# Patient Record
Sex: FEMALE | Race: OTHER | HISPANIC OR LATINO | URBAN - METROPOLITAN AREA
[De-identification: names, ages, dates, MRNs, and addresses within clinical notes are randomized per-mention and may not be internally consistent; named-entity substitution may affect disease eponyms.]

---

## 2020-02-18 ENCOUNTER — INPATIENT (INPATIENT)
Facility: HOSPITAL | Age: 55
LOS: 3 days | Discharge: HOME | End: 2020-02-22
Attending: INTERNAL MEDICINE | Admitting: INTERNAL MEDICINE
Payer: COMMERCIAL

## 2020-02-18 VITALS
SYSTOLIC BLOOD PRESSURE: 112 MMHG | WEIGHT: 130.07 LBS | OXYGEN SATURATION: 97 % | HEART RATE: 90 BPM | RESPIRATION RATE: 16 BRPM | DIASTOLIC BLOOD PRESSURE: 67 MMHG | TEMPERATURE: 100 F | HEIGHT: 63 IN

## 2020-02-18 PROCEDURE — 99285 EMERGENCY DEPT VISIT HI MDM: CPT

## 2020-02-19 DIAGNOSIS — N39.0 URINARY TRACT INFECTION, SITE NOT SPECIFIED: ICD-10-CM

## 2020-02-19 DIAGNOSIS — Z29.9 ENCOUNTER FOR PROPHYLACTIC MEASURES, UNSPECIFIED: ICD-10-CM

## 2020-02-19 DIAGNOSIS — N20.1 CALCULUS OF URETER: ICD-10-CM

## 2020-02-19 DIAGNOSIS — N12 TUBULO-INTERSTITIAL NEPHRITIS, NOT SPECIFIED AS ACUTE OR CHRONIC: ICD-10-CM

## 2020-02-19 LAB
ALBUMIN SERPL ELPH-MCNC: 3.2 G/DL — LOW (ref 3.3–5)
ALP SERPL-CCNC: 132 U/L — HIGH (ref 40–120)
ALT FLD-CCNC: 44 U/L — SIGNIFICANT CHANGE UP (ref 12–78)
ANION GAP SERPL CALC-SCNC: 6 MMOL/L — SIGNIFICANT CHANGE UP (ref 5–17)
ANION GAP SERPL CALC-SCNC: 9 MMOL/L — SIGNIFICANT CHANGE UP (ref 5–17)
APPEARANCE UR: CLEAR — SIGNIFICANT CHANGE UP
APTT BLD: 27.9 SEC — LOW (ref 28.5–37)
AST SERPL-CCNC: 29 U/L — SIGNIFICANT CHANGE UP (ref 15–37)
BACTERIA # UR AUTO: ABNORMAL
BASOPHILS # BLD AUTO: 0.02 K/UL — SIGNIFICANT CHANGE UP (ref 0–0.2)
BASOPHILS # BLD AUTO: 0.02 K/UL — SIGNIFICANT CHANGE UP (ref 0–0.2)
BASOPHILS NFR BLD AUTO: 0.2 % — SIGNIFICANT CHANGE UP (ref 0–2)
BASOPHILS NFR BLD AUTO: 0.3 % — SIGNIFICANT CHANGE UP (ref 0–2)
BILIRUB SERPL-MCNC: 0.5 MG/DL — SIGNIFICANT CHANGE UP (ref 0.2–1.2)
BILIRUB UR-MCNC: NEGATIVE — SIGNIFICANT CHANGE UP
BLD GP AB SCN SERPL QL: SIGNIFICANT CHANGE UP
BUN SERPL-MCNC: 7 MG/DL — SIGNIFICANT CHANGE UP (ref 7–23)
BUN SERPL-MCNC: 8 MG/DL — SIGNIFICANT CHANGE UP (ref 7–23)
CALCIUM SERPL-MCNC: 8.5 MG/DL — SIGNIFICANT CHANGE UP (ref 8.5–10.1)
CALCIUM SERPL-MCNC: 8.9 MG/DL — SIGNIFICANT CHANGE UP (ref 8.5–10.1)
CHLORIDE SERPL-SCNC: 100 MMOL/L — SIGNIFICANT CHANGE UP (ref 96–108)
CHLORIDE SERPL-SCNC: 105 MMOL/L — SIGNIFICANT CHANGE UP (ref 96–108)
CO2 SERPL-SCNC: 24 MMOL/L — SIGNIFICANT CHANGE UP (ref 22–31)
CO2 SERPL-SCNC: 27 MMOL/L — SIGNIFICANT CHANGE UP (ref 22–31)
COLOR SPEC: YELLOW — SIGNIFICANT CHANGE UP
CREAT SERPL-MCNC: 0.77 MG/DL — SIGNIFICANT CHANGE UP (ref 0.5–1.3)
CREAT SERPL-MCNC: 0.94 MG/DL — SIGNIFICANT CHANGE UP (ref 0.5–1.3)
DIFF PNL FLD: ABNORMAL
EOSINOPHIL # BLD AUTO: 0 K/UL — SIGNIFICANT CHANGE UP (ref 0–0.5)
EOSINOPHIL # BLD AUTO: 0 K/UL — SIGNIFICANT CHANGE UP (ref 0–0.5)
EOSINOPHIL NFR BLD AUTO: 0 % — SIGNIFICANT CHANGE UP (ref 0–6)
EOSINOPHIL NFR BLD AUTO: 0 % — SIGNIFICANT CHANGE UP (ref 0–6)
EPI CELLS # UR: ABNORMAL
FLU A RESULT: SIGNIFICANT CHANGE UP
FLU A RESULT: SIGNIFICANT CHANGE UP
FLUAV AG NPH QL: SIGNIFICANT CHANGE UP
FLUBV AG NPH QL: SIGNIFICANT CHANGE UP
GLUCOSE SERPL-MCNC: 101 MG/DL — HIGH (ref 70–99)
GLUCOSE SERPL-MCNC: 105 MG/DL — HIGH (ref 70–99)
GLUCOSE UR QL: NEGATIVE MG/DL — SIGNIFICANT CHANGE UP
HCT VFR BLD CALC: 30.2 % — LOW (ref 34.5–45)
HCT VFR BLD CALC: 33.9 % — LOW (ref 34.5–45)
HGB BLD-MCNC: 10 G/DL — LOW (ref 11.5–15.5)
HGB BLD-MCNC: 11.4 G/DL — LOW (ref 11.5–15.5)
IMM GRANULOCYTES NFR BLD AUTO: 0.2 % — SIGNIFICANT CHANGE UP (ref 0–1.5)
IMM GRANULOCYTES NFR BLD AUTO: 0.4 % — SIGNIFICANT CHANGE UP (ref 0–1.5)
INR BLD: 1.55 RATIO — HIGH (ref 0.88–1.16)
KETONES UR-MCNC: ABNORMAL
LACTATE SERPL-SCNC: 1.3 MMOL/L — SIGNIFICANT CHANGE UP (ref 0.7–2)
LEUKOCYTE ESTERASE UR-ACNC: ABNORMAL
LYMPHOCYTES # BLD AUTO: 1.2 K/UL — SIGNIFICANT CHANGE UP (ref 1–3.3)
LYMPHOCYTES # BLD AUTO: 1.23 K/UL — SIGNIFICANT CHANGE UP (ref 1–3.3)
LYMPHOCYTES # BLD AUTO: 11.6 % — LOW (ref 13–44)
LYMPHOCYTES # BLD AUTO: 16.2 % — SIGNIFICANT CHANGE UP (ref 13–44)
MAGNESIUM SERPL-MCNC: 2.3 MG/DL — SIGNIFICANT CHANGE UP (ref 1.6–2.6)
MCHC RBC-ENTMCNC: 28.9 PG — SIGNIFICANT CHANGE UP (ref 27–34)
MCHC RBC-ENTMCNC: 29.2 PG — SIGNIFICANT CHANGE UP (ref 27–34)
MCHC RBC-ENTMCNC: 33.1 GM/DL — SIGNIFICANT CHANGE UP (ref 32–36)
MCHC RBC-ENTMCNC: 33.6 GM/DL — SIGNIFICANT CHANGE UP (ref 32–36)
MCV RBC AUTO: 86.9 FL — SIGNIFICANT CHANGE UP (ref 80–100)
MCV RBC AUTO: 87.3 FL — SIGNIFICANT CHANGE UP (ref 80–100)
MONOCYTES # BLD AUTO: 0.74 K/UL — SIGNIFICANT CHANGE UP (ref 0–0.9)
MONOCYTES # BLD AUTO: 0.76 K/UL — SIGNIFICANT CHANGE UP (ref 0–0.9)
MONOCYTES NFR BLD AUTO: 10 % — SIGNIFICANT CHANGE UP (ref 2–14)
MONOCYTES NFR BLD AUTO: 7.2 % — SIGNIFICANT CHANGE UP (ref 2–14)
NEUTROPHILS # BLD AUTO: 5.41 K/UL — SIGNIFICANT CHANGE UP (ref 1.8–7.4)
NEUTROPHILS # BLD AUTO: 8.55 K/UL — HIGH (ref 1.8–7.4)
NEUTROPHILS NFR BLD AUTO: 73.1 % — SIGNIFICANT CHANGE UP (ref 43–77)
NEUTROPHILS NFR BLD AUTO: 80.8 % — HIGH (ref 43–77)
NITRITE UR-MCNC: NEGATIVE — SIGNIFICANT CHANGE UP
NRBC # BLD: 0 /100 WBCS — SIGNIFICANT CHANGE UP (ref 0–0)
NRBC # BLD: 0 /100 WBCS — SIGNIFICANT CHANGE UP (ref 0–0)
PH UR: 8 — SIGNIFICANT CHANGE UP (ref 5–8)
PHOSPHATE SERPL-MCNC: 2.5 MG/DL — SIGNIFICANT CHANGE UP (ref 2.5–4.5)
PLATELET # BLD AUTO: 263 K/UL — SIGNIFICANT CHANGE UP (ref 150–400)
PLATELET # BLD AUTO: 290 K/UL — SIGNIFICANT CHANGE UP (ref 150–400)
POTASSIUM SERPL-MCNC: 3.3 MMOL/L — LOW (ref 3.5–5.3)
POTASSIUM SERPL-MCNC: 3.6 MMOL/L — SIGNIFICANT CHANGE UP (ref 3.5–5.3)
POTASSIUM SERPL-SCNC: 3.3 MMOL/L — LOW (ref 3.5–5.3)
POTASSIUM SERPL-SCNC: 3.6 MMOL/L — SIGNIFICANT CHANGE UP (ref 3.5–5.3)
PROT SERPL-MCNC: 8.8 GM/DL — HIGH (ref 6–8.3)
PROT UR-MCNC: 30 MG/DL
PROTHROM AB SERPL-ACNC: 17.6 SEC — HIGH (ref 10–12.9)
RBC # BLD: 3.46 M/UL — LOW (ref 3.8–5.2)
RBC # BLD: 3.9 M/UL — SIGNIFICANT CHANGE UP (ref 3.8–5.2)
RBC # FLD: 13.2 % — SIGNIFICANT CHANGE UP (ref 10.3–14.5)
RBC # FLD: 13.2 % — SIGNIFICANT CHANGE UP (ref 10.3–14.5)
RBC CASTS # UR COMP ASSIST: ABNORMAL /HPF (ref 0–4)
RSV RESULT: SIGNIFICANT CHANGE UP
RSV RNA RESP QL NAA+PROBE: SIGNIFICANT CHANGE UP
SODIUM SERPL-SCNC: 135 MMOL/L — SIGNIFICANT CHANGE UP (ref 135–145)
SODIUM SERPL-SCNC: 136 MMOL/L — SIGNIFICANT CHANGE UP (ref 135–145)
SP GR SPEC: 1.01 — SIGNIFICANT CHANGE UP (ref 1.01–1.02)
UROBILINOGEN FLD QL: NEGATIVE MG/DL — SIGNIFICANT CHANGE UP
WBC # BLD: 10.58 K/UL — HIGH (ref 3.8–10.5)
WBC # BLD: 7.4 K/UL — SIGNIFICANT CHANGE UP (ref 3.8–10.5)
WBC # FLD AUTO: 10.58 K/UL — HIGH (ref 3.8–10.5)
WBC # FLD AUTO: 7.4 K/UL — SIGNIFICANT CHANGE UP (ref 3.8–10.5)
WBC UR QL: ABNORMAL

## 2020-02-19 PROCEDURE — 99222 1ST HOSP IP/OBS MODERATE 55: CPT

## 2020-02-19 PROCEDURE — 12345: CPT | Mod: NC

## 2020-02-19 PROCEDURE — 99223 1ST HOSP IP/OBS HIGH 75: CPT

## 2020-02-19 PROCEDURE — 74176 CT ABD & PELVIS W/O CONTRAST: CPT | Mod: 26

## 2020-02-19 PROCEDURE — 71045 X-RAY EXAM CHEST 1 VIEW: CPT | Mod: 26

## 2020-02-19 RX ORDER — POTASSIUM CHLORIDE 20 MEQ
40 PACKET (EA) ORAL EVERY 4 HOURS
Refills: 0 | Status: COMPLETED | OUTPATIENT
Start: 2020-02-19 | End: 2020-02-19

## 2020-02-19 RX ORDER — SODIUM CHLORIDE 9 MG/ML
1000 INJECTION, SOLUTION INTRAVENOUS
Refills: 0 | Status: DISCONTINUED | OUTPATIENT
Start: 2020-02-19 | End: 2020-02-21

## 2020-02-19 RX ORDER — SODIUM CHLORIDE 9 MG/ML
1000 INJECTION INTRAMUSCULAR; INTRAVENOUS; SUBCUTANEOUS
Refills: 0 | Status: DISCONTINUED | OUTPATIENT
Start: 2020-02-19 | End: 2020-02-19

## 2020-02-19 RX ORDER — ACETAMINOPHEN 500 MG
650 TABLET ORAL EVERY 6 HOURS
Refills: 0 | Status: DISCONTINUED | OUTPATIENT
Start: 2020-02-19 | End: 2020-02-21

## 2020-02-19 RX ORDER — MEROPENEM 1 G/30ML
1000 INJECTION INTRAVENOUS EVERY 8 HOURS
Refills: 0 | Status: DISCONTINUED | OUTPATIENT
Start: 2020-02-19 | End: 2020-02-21

## 2020-02-19 RX ORDER — SODIUM CHLORIDE 9 MG/ML
1000 INJECTION INTRAMUSCULAR; INTRAVENOUS; SUBCUTANEOUS ONCE
Refills: 0 | Status: COMPLETED | OUTPATIENT
Start: 2020-02-19 | End: 2020-02-19

## 2020-02-19 RX ORDER — TAMSULOSIN HYDROCHLORIDE 0.4 MG/1
0.4 CAPSULE ORAL AT BEDTIME
Refills: 0 | Status: DISCONTINUED | OUTPATIENT
Start: 2020-02-19 | End: 2020-02-21

## 2020-02-19 RX ORDER — ONDANSETRON 8 MG/1
4 TABLET, FILM COATED ORAL EVERY 6 HOURS
Refills: 0 | Status: DISCONTINUED | OUTPATIENT
Start: 2020-02-19 | End: 2020-02-21

## 2020-02-19 RX ORDER — CEFEPIME 1 G/1
1000 INJECTION, POWDER, FOR SOLUTION INTRAMUSCULAR; INTRAVENOUS ONCE
Refills: 0 | Status: COMPLETED | OUTPATIENT
Start: 2020-02-19 | End: 2020-02-19

## 2020-02-19 RX ADMIN — Medication 40 MILLIEQUIVALENT(S): at 17:26

## 2020-02-19 RX ADMIN — Medication 650 MILLIGRAM(S): at 07:11

## 2020-02-19 RX ADMIN — SODIUM CHLORIDE 125 MILLILITER(S): 9 INJECTION INTRAMUSCULAR; INTRAVENOUS; SUBCUTANEOUS at 04:57

## 2020-02-19 RX ADMIN — Medication 650 MILLIGRAM(S): at 18:05

## 2020-02-19 RX ADMIN — CEFEPIME 100 MILLIGRAM(S): 1 INJECTION, POWDER, FOR SOLUTION INTRAMUSCULAR; INTRAVENOUS at 03:51

## 2020-02-19 RX ADMIN — SODIUM CHLORIDE 125 MILLILITER(S): 9 INJECTION INTRAMUSCULAR; INTRAVENOUS; SUBCUTANEOUS at 13:14

## 2020-02-19 RX ADMIN — Medication 40 MILLIEQUIVALENT(S): at 13:14

## 2020-02-19 RX ADMIN — Medication 650 MILLIGRAM(S): at 06:31

## 2020-02-19 RX ADMIN — MEROPENEM 100 MILLIGRAM(S): 1 INJECTION INTRAVENOUS at 13:14

## 2020-02-19 RX ADMIN — SODIUM CHLORIDE 1000 MILLILITER(S): 9 INJECTION INTRAMUSCULAR; INTRAVENOUS; SUBCUTANEOUS at 03:25

## 2020-02-19 RX ADMIN — Medication 40 MILLIEQUIVALENT(S): at 10:48

## 2020-02-19 RX ADMIN — MEROPENEM 100 MILLIGRAM(S): 1 INJECTION INTRAVENOUS at 05:21

## 2020-02-19 RX ADMIN — SODIUM CHLORIDE 1000 MILLILITER(S): 9 INJECTION INTRAMUSCULAR; INTRAVENOUS; SUBCUTANEOUS at 02:25

## 2020-02-19 RX ADMIN — SODIUM CHLORIDE 100 MILLILITER(S): 9 INJECTION, SOLUTION INTRAVENOUS at 19:27

## 2020-02-19 RX ADMIN — MEROPENEM 100 MILLIGRAM(S): 1 INJECTION INTRAVENOUS at 21:21

## 2020-02-19 RX ADMIN — TAMSULOSIN HYDROCHLORIDE 0.4 MILLIGRAM(S): 0.4 CAPSULE ORAL at 21:21

## 2020-02-19 NOTE — ED PROVIDER NOTE - OBJECTIVE STATEMENT
.in'' Pertinent PMH/PSH/FHx/SHx and Review of Systems contained within:    53yo F w PMH of kidney stones, previous UTI presents to ED for eval of fever x1wk.  Pt states she has been taking bactrim for UTI, but noted fever persists.  Denies CP, SOB, rash, abd pain, vomiting, diarrhea Pertinent PMH/PSH/FHx/SHx and Review of Systems contained within:    55yo F w PMH of kidney stones, previous UTI presents to ED for eval of fever x1wk.  Pt states she has been taking bactrim for UTI, but noted fever persists.  Denies CP, SOB, rash, abd pain, vomiting, diarrhea.    +fever, No photophobia/eye pain/changes in vision, No ear pain/sore throat/dysphagia, No chest pain/palpitations, no SOB/cough/wheeze/stridor, No abdominal pain, No neck/back pain, no rash, no changes in neurological status/function.

## 2020-02-19 NOTE — H&P ADULT - HISTORY OF PRESENT ILLNESS
Patient is 54 yr old female with PMH of Kidney stones ( Lithotripsy 2017 left side) , UTI ( multi drug resistance) presented to Er complaining of undocumented fever x 1 week . Patient was taking Bactrim for UTI however fever , chills and nausea persisted. In addition, patient took a laxative for constipation. Patient denies  cold, cough, sob, palp, cp, blurry vision, , headache,     While in the Er patient received 1 liter Bolus NS , IV abx cefepime , wbc    10.58  , CT Renal Left hydroureteronephrosis caused by a 3 mm mid to distal left ureteral stone.  Superimposed genitourinary infection and/or pyelonephritis should be excluded based on clinical symptoms and laboratory values.  No other stones are visualized in the kidneys, ureters or bladder. EKG NSR @ 87 t wave abnormalities         ER Doc Sarah spoke with Dr Pham who accepted the patient to hospitalist service.

## 2020-02-19 NOTE — H&P ADULT - NSICDXFAMILYHX_GEN_ALL_CORE_FT
FAMILY HISTORY:  Family history of hyperlipidemia, mother  Family history of oral cancer  Family hx of hypertension, mother  FH: myocardial infarction, father  at age 83

## 2020-02-19 NOTE — CONSULT NOTE ADULT - SUBJECTIVE AND OBJECTIVE BOX
History of Present Illness: 	  Patient is 54 yr old female with PMH of Kidney stones ( Lithotripsy 2017 left side) , UTI ( multi drug resistance) presented to Er complaining of undocumented fever x 1 week . Patient was taking Bactrim for UTI however fever , chills and nausea persisted. In addition, patient took a laxative for constipation. Patient denies  cold, cough, sob, palp, cp, blurry vision, , headache,     Past Medical, Past Surgical, and Family History:  PAST MEDICAL HISTORY:  Kidney stone.     FAMILY HISTORY:  Family history of hyperlipidemia, mother  Family history of oral cancer  Family hx of hypertension, mother  FH: myocardial infarction, father  at age 83.    Social History:  Social History (marital status, living situation, occupation, tobacco use, alcohol and drug use, and sexual history): lives with daughter  works as    no flu or pneum shot   denies ETOH, IVDA, pot , socially drinks at parties	  Review of Systems:  Review of Systems: REVIEW OF SYSTEMS:  Constitutional: Denies fever, weight loss, fatigue  Eye: Denies eye pain, visual changes, discharge, blurred vision  ENT: Denies hearing changes, tinnitus, vertigo, sinus congestion, sore throat  Neck: Denies pain or stiffness  Respiratory: Denies cough, wheezing, chills, hemoptysis, shortness of breath, difficulty breathing  Cardiovascular: Denies chest pain, palpitations, dizziness, leg swelling  Gastrointestinal: Denies abdominal pain,+ nausea, vomiting, hematemesis, diarrhea, constipation, melena, hematochezia + left cva tenderness  Genitourinary: Denies dysuria, frequency, hematuria, retention, incontinence  Neurological: Denies headaches, memory loss, loss of strength, numbness, tremors  Skin: Denies itching, burning, rashes, lesions   Endocrine: Denies heat or cold intolerance, hair loss  Musculoskeletal: Denies joint pain or swelling, back, extremity pain  Psychiatric: Denies depression, anxiety, mood swings, difficulty sleeping, suicidal ideation  Hematology: Denies easy bruising, bleeding gums Immunologic: Denies hives or eczema	      Allergies and Intolerances:        Allergies:  	No Known Allergies:

## 2020-02-19 NOTE — H&P ADULT - PROBLEM SELECTOR PLAN 1
admit to medicine  follow up urine culture and blood cx  repeat cbc , cmp, in AM  IV fluids  iv meropenem  Consult ID Dr Hardy admit to medicine, follow up urine culture and blood cx  repeat cbc , cmp, in AM   IV fluids  iv meropenem as patient reports hx of drug resistance.    Consult ID Dr Oliva

## 2020-02-19 NOTE — H&P ADULT - NSHPPHYSICALEXAM_GEN_ALL_CORE
Vital Signs Last 24 Hrs  T(C): 37.6 (18 Feb 2020 23:19), Max: 37.6 (18 Feb 2020 23:19)  T(F): 99.7 (18 Feb 2020 23:19), Max: 99.7 (18 Feb 2020 23:19)  HR: 90 (18 Feb 2020 23:19) (90 - 90)  BP: 112/67 (18 Feb 2020 23:19) (112/67 - 112/67)  BP(mean): --  RR: 16 (18 Feb 2020 23:19) (16 - 16)  SpO2: 97% (18 Feb 2020 23:19) (97% - 97%)    PHYSICAL EXAM:  GENERAL: NAD, well-groomed, well-developed  HEAD:  Atraumatic, Normocephalic  EYES: EOMI, PERRLA, conjunctiva and sclera clear  ENMT: No tonsillar erythema, exudates, or enlargement; Moist mucous membranes, Good dentition, No lesions  NECK: Supple, No JVD, Normal thyroid  NERVOUS SYSTEM:  Alert & Oriented X3, Good concentration; Motor Strength 5/5 B/L upper and lower extremities; DTRs 2+ intact and symmetric  CHEST/LUNG: Clear to auscultation bilaterally; No rales, rhonchi, wheezing, or rubs  HEART: Regular rate and rhythm; No murmurs appreciated  ABDOMEN: Soft,mild supratenderness Nondistended; Bowel sounds present + left CVA tenderness ,  MSK: ROM intact in all extremities, 5/5 strength in upper and lower extremities, B/L.  EXTREMITIES:  2+ Peripheral Pulses, No clubbing, cyanosis, or edema

## 2020-02-19 NOTE — ED ADULT NURSE NOTE - OBJECTIVE STATEMENT
pt received to bed 22 c/o fever for 1 week. pt states she sees a urologist for left kidney stone. pt states she and was given antibiotics for persistent E. coli, but has not experienced relief. c/o increased saliva in mouth, bad taste in mouth, decreased appetite. denies: sore throat, burning on urination, dysuria. pt states she took a laxative today. pt states she vomited last night. pt took Tylenol at 9PM. family at bedside

## 2020-02-19 NOTE — PROGRESS NOTE ADULT - SUBJECTIVE AND OBJECTIVE BOX
54 yr old female with history of Kidney stones ( Lithotripsy 2017 left side) , UTI ( multi drug resistance) p/w a UTI after failing Bactrim treatment. CT showed left hydroureteronephrosis caused by a 3 mm mid to distal left ureteral stone w/ a suspected genitourinary infection and/or pyelonephritis. Pt is on Merrem. She is lying in bed in NAD and has no flank tenderness. Cultures are pending. Urology is following and will do a left ureteroscopy, laser lithotripsy & stent placement tomorrow.

## 2020-02-19 NOTE — H&P ADULT - NSHPSOCIALHISTORY_GEN_ALL_CORE
lives with daughter  works as     no flu or pneum shot    denies ETOH, IVDA, pot , socially drinks at parties

## 2020-02-19 NOTE — CONSULT NOTE ADULT - ASSESSMENT
Left Pyelonephritis: urine culture Continue Merrem.    3 mm left mid ureteral stone with hydronephrosis. Chronic obstruction. ? stricture ureter or residual stone in mid ureter from previous procedure. : Once urine culture and antibiotics on board will require drainage of kidney with removal of stone and treatment of ?ureteral stricture.    Pain management.  Thanks  will follow

## 2020-02-19 NOTE — H&P ADULT - ASSESSMENT
54 yr old female with PMH of Kidney stones ( Lithotripsy 2017 left side) , UTI ( multi drug resistance) presented to Er complaining of undocumented fever x 1 week. Work Up , signs and symptoms consistent with pyelopnephritis  and ureteral stone . Patient will require at least 2 midnights for iv fluids , iv abx as detailed below

## 2020-02-19 NOTE — CONSULT NOTE ADULT - PROBLEM SELECTOR RECOMMENDATION 9
IVF  IVAB - ID consultation. Dr. Hardy called by medicine.  analgesia PRN  scheduled for OR tomorrow for left ureteroscopy, laser lithotripsy; stent placement

## 2020-02-19 NOTE — ED ADULT NURSE NOTE - ED STAT RN HANDOFF DETAILS
Report endorsed to hold RN Nury. Safety checks completed this shift. Safety rounds completed hourly. Both IV sites checked Q2+remains WDL. Medications administered as ordered with no signs/symptoms of adverse reactions. Fall & skin precautions in place. Any issues endorsed to hold RN for follow up. Awaiting bed assignment. witnessed pt ambulate with steady gait with standby assistance. family at bedside. Cefepime infusing on pump. awaiting vitals, additional antibiotic

## 2020-02-19 NOTE — CONSULT NOTE ADULT - ASSESSMENT
Vital Signs Last 24 Hrs  T(C): 38.1 (2020 06:32), Max: 38.1 (2020 06:32)  T(F): 100.5 (2020 06:32), Max: 100.5 (2020 06:32)  HR: 83 (2020 06:32) (83 - 90)  BP: 98/54 (2020 06:32) (97/57 - 112/67)  BP(mean): --  RR: 17 (2020 06:32) (16 - 17)  SpO2: 99% (2020 06:32) (97% - 99%)    Head normocephalic  ENT WNL  Cardiac nl regular rhythm, no murmur  Lungs clear to auscultation  Abd. soft, nontender, BS+   no CVAT at this time  GI WNL  Extremities WNL                          10.0   7.40  )-----------( 263      ( 2020 08:19 )             30.2           135  |  105  |  7   ----------------------------<  101<H>  3.3<L>   |  24  |  0.77    Ca    8.5      2020 08:19  Phos  2.5       Mg     2.3         TPro  8.8<H>  /  Alb  3.2<L>  /  TBili  0.5  /  DBili  x   /  AST  29  /  ALT  44  /  AlkPhos  132<H>        PT/INR - ( 2020 02:00 )   PT: 17.6 sec;   INR: 1.55 ratio         PTT - ( 2020 02:00 )  PTT:27.9 sec    Urinalysis Basic - ( 2020 02:00 )    Color: Yellow / Appearance: Clear / S.010 / pH: x  Gluc: x / Ketone: Small  / Bili: Negative / Urobili: Negative mg/dL   Blood: x / Protein: 30 mg/dL / Nitrite: Negative   Leuk Esterase: Moderate / RBC: 6-10 /HPF / WBC 26-50   Sq Epi: x / Non Sq Epi: Many / Bacteria: TNTC    < from: CT Renal Stone Hunt (20 @ 02:28) >    EXAM:  CT RENAL STONE HUNT                            PROCEDURE DATE:  2020          INTERPRETATION:  CLINICAL INFORMATION: Left flank pain.  History of renal stones.    COMPARISON: None.    PROCEDURE:   CT of the Abdomen and Pelvis was performed without intravenous contrast.   Intravenous contrast: None.  Oral contrast: None.  Sagittal and coronal reformats were performed.    FINDINGS:    LOWER CHEST: Within normal limits.    LIVER: Within normal limits.  BILE DUCTS: Normal caliber.  GALLBLADDER: Within normal limits.  SPLEEN: Within normal limits.  PANCREAS: Within normal limits.  ADRENALS: Within normal limits.  KIDNEYS/URETERS: Left hydroureteronephrosis caused by a 3 mm stone located in the mid to distal left ureter, at the levelof the pelvic brim (2:71 and 602:42).    BLADDER: Underdistended, limiting assessment.  REPRODUCTIVE ORGANS: Uterus and ovaries appear within normal limits.    BOWEL: No bowel obstruction. Normal appendix.  PERITONEUM: No ascites.  VESSELS: Within normal limits.  RETROPERITONEUM/LYMPH NODES: No lymphadenopathy.    ABDOMINAL WALL: Tiny fat-containing umbilical hernia.  BONES: Within normal limits.    IMPRESSION:     Left hydroureteronephrosis caused by a 3 mm mid to distal left ureteral stone.  Superimposed genitourinary infection and/or pyelonephritis should be excluded based on clinical symptoms and laboratory values.  No other stones are visualized in the kidneys, ureters or bladder.    BETH BRUCE M.D.,ATTENDING RADIOLOGIST  This document has been electronically signed. 2020  2:58AM         Impression:  Pt was seen with Dr. Camarena.  Left ureteral stone causing hydronephrosis  probable UTI - C&S pending    Plan:   [facility]  IVF  Medical consult  pt is scheduled to go to OR tomorrow for left ureteroscopy, laser lithotripsy

## 2020-02-19 NOTE — CONSULT NOTE ADULT - SUBJECTIVE AND OBJECTIVE BOX
HPI:  Patient is 54 yr old female with PMH of Kidney stones ( Lithotripsy 2017 left side) and stent , UTI ( multi drug resistance) presented to Er complaining of undocumented fever x 1 week . Patient was taking Bactrim for UTI however fever , chills and nausea persisted. In addition, patient took a laxative for constipation. Patient denies  cold, cough, sob, palp, cp, blurry vision, , headache,     While in the Er patient received 1 liter Bolus NS , IV abx cefepime , wbc    10.58  , CT Renal Left hydroureteronephrosis caused by a 3 mm mid to distal left ureteral stone.  Superimposed genitourinary infection and/or pyelonephritis should be excluded based on clinical symptoms and laboratory values.  No other stones are visualized in the kidneys, ureters or bladder. EKG NSR @ 87 t wave abnormalities         ER Doc Sarah spoke with Dr Pham who accepted the patient to hospitalist service. (2020 03:56)      PAST MEDICAL & SURGICAL HISTORY:  Kidney stone  ESWL and Stent 2017 for left ureteral or renal stone      Allergies    No Known Allergies    FAMILY HISTORY:     Family history of oral cancer  Family history of hyperlipidemia: mother  FH: myocardial infarction: father  at age 83  Family hx of hypertension: mother      Home Medications:  none    MEDICATIONS  (STANDING):  meropenem  IVPB 1000 milliGRAM(s) IV Intermittent every 8 hours  potassium chloride    Tablet ER 40 milliEquivalent(s) Oral every 4 hours  sodium chloride 0.9%. 1000 milliLiter(s) (125 mL/Hr) IV Continuous <Continuous>  tamsulosin 0.4 milliGRAM(s) Oral at bedtime    MEDICATIONS  (PRN):  acetaminophen   Tablet .. 650 milliGRAM(s) Oral every 6 hours PRN Temp greater or equal to 38C (100.4F), Mild Pain (1 - 3)  ondansetron Injectable 4 milliGRAM(s) IV Push every 6 hours PRN Nausea and/or Vomiting      ROS:    General:  No wt loss, chills, night sweats, has fever  ENT:  No sore throat, pain, runny nose,   CV:  No pain, palpitations  Resp:  No dyspnea, cough, tachypnea, wheezing  GI:  No pain, nausea, vomiting, diarrhea, constipation  :  No bleeding, incontinence, nocturia, frequency. Has left flank pain  Neuro:  No weakness, tingling,   Endocrine:  No polyuria, polydipsia cold/heat intolerance  Skin:  No rash,  edema      Physical Exam:    Vital Signs:  Vital Signs Last 24 Hrs  T(C): 38.1 (2020 06:32), Max: 38.1 (2020 06:32)  T(F): 100.5 (2020 06:32), Max: 100.5 (2020 06:32)  HR: 83 (2020 06:32) (83 - 90)  BP: 98/54 (2020 06:32) (97/57 - 112/67)  BP(mean): --  RR: 17 (2020 06:32) (16 - 17)  SpO2: 99% (2020 06:32) (97% - 99%)  Daily Height in cm: 160.02 (2020 06:32)    Daily   I&O's Summary      General:  Appears stated age,  well-nourished, no distress  HEENT:  NC/AT, patent nares w/ pink mucosa, OP moist and pink,   conjunctivae clear  Chest:  Full & symmetric excursion, no increased effort.   Abdomen:  Soft, non-tender, non-distended, normoactive bowel sounds. Left CVA tenderness present   Pelvic Exam: deferred  Rectal Examination: Deferred.  Extremities:  no edema, pedal pusation are present, no calf tenderness.  Skin:  No rash/erythema  Neuro/Psych:  Alert and conscious. Grossly intact and symmetrical.      LABS:                        10.0   7.40  )-----------( 263      ( 2020 08:19 )             30.2     -    135  |  105  |  7   ----------------------------<  101<H>  3.3<L>   |  24  |  0.77    Ca    8.5      2020 08:19  Phos  2.5     -  Mg     2.3         TPro  8.8<H>  /  Alb  3.2<L>  /  TBili  0.5  /  DBili  x   /  AST  29  /  ALT  44  /  AlkPhos  132<H>  -    PT/INR - ( 2020 02:00 )   PT: 17.6 sec;   INR: 1.55 ratio         PTT - ( 2020 02:00 )  PTT:27.9 sec  Urinalysis Basic - ( 2020 02:00 )    Color: Yellow / Appearance: Clear / S.010 / pH: x  Gluc: x / Ketone: Small  / Bili: Negative / Urobili: Negative mg/dL   Blood: x / Protein: 30 mg/dL / Nitrite: Negative   Leuk Esterase: Moderate / RBC: 6-10 /HPF / WBC 26-50   Sq Epi: x / Non Sq Epi: Many / Bacteria: TNTC          RADIOLOGY & ADDITIONAL STUDIES:    EXAM:  CT RENAL STONE HUNT                            PROCEDURE DATE:  2020          INTERPRETATION:  CLINICAL INFORMATION: Left flank pain.  History of renal stones.    COMPARISON: None.    PROCEDURE:   CT of the Abdomen and Pelvis was performed without intravenous contrast.   Intravenous contrast: None.  Oral contrast: None.  Sagittal and coronal reformats were performed.    FINDINGS:    LOWER CHEST: Within normal limits.    LIVER: Within normal limits.  BILE DUCTS: Normal caliber.  GALLBLADDER: Within normal limits.  SPLEEN: Within normal limits.  PANCREAS: Within normal limits.  ADRENALS: Within normal limits.  KIDNEYS/URETERS: Left hydroureteronephrosis caused by a 3 mm stone located in the mid to distal left ureter, at the levelof the pelvic brim (2:71 and 602:42).    BLADDER: Underdistended, limiting assessment.  REPRODUCTIVE ORGANS: Uterus and ovaries appear within normal limits.    BOWEL: No bowel obstruction. Normal appendix.  PERITONEUM: No ascites.  VESSELS: Within normal limits.  RETROPERITONEUM/LYMPH NODES: No lymphadenopathy.    ABDOMINAL WALL: Tiny fat-containing umbilical hernia.  BONES: Within normal limits.    IMPRESSION:     Left hydroureteronephrosis caused by a 3 mm mid to distal left ureteral stone.  Superimposed genitourinary infection and/or pyelonephritis should be excluded based on clinical symptoms and laboratory values.  No other stones are visualized in the kidneys, ureters or bladder.        BETH BRUCE M.D.,ATTENDING RADIOLOGIST  This document has been electronically signed. 2020  2:58AM

## 2020-02-20 ENCOUNTER — TRANSCRIPTION ENCOUNTER (OUTPATIENT)
Age: 55
End: 2020-02-20

## 2020-02-20 LAB
ANION GAP SERPL CALC-SCNC: 5 MMOL/L — SIGNIFICANT CHANGE UP (ref 5–17)
APTT BLD: 26.7 SEC — LOW (ref 28.5–37)
BUN SERPL-MCNC: 7 MG/DL — SIGNIFICANT CHANGE UP (ref 7–23)
CALCIUM SERPL-MCNC: 9 MG/DL — SIGNIFICANT CHANGE UP (ref 8.5–10.1)
CHLORIDE SERPL-SCNC: 108 MMOL/L — SIGNIFICANT CHANGE UP (ref 96–108)
CO2 SERPL-SCNC: 24 MMOL/L — SIGNIFICANT CHANGE UP (ref 22–31)
CREAT SERPL-MCNC: 0.72 MG/DL — SIGNIFICANT CHANGE UP (ref 0.5–1.3)
GLUCOSE SERPL-MCNC: 91 MG/DL — SIGNIFICANT CHANGE UP (ref 70–99)
HCT VFR BLD CALC: 31.9 % — LOW (ref 34.5–45)
HGB BLD-MCNC: 10.6 G/DL — LOW (ref 11.5–15.5)
INR BLD: 1.39 RATIO — HIGH (ref 0.88–1.16)
MAGNESIUM SERPL-MCNC: 2.4 MG/DL — SIGNIFICANT CHANGE UP (ref 1.6–2.6)
MCHC RBC-ENTMCNC: 29.3 PG — SIGNIFICANT CHANGE UP (ref 27–34)
MCHC RBC-ENTMCNC: 33.2 GM/DL — SIGNIFICANT CHANGE UP (ref 32–36)
MCV RBC AUTO: 88.1 FL — SIGNIFICANT CHANGE UP (ref 80–100)
NRBC # BLD: 0 /100 WBCS — SIGNIFICANT CHANGE UP (ref 0–0)
PHOSPHATE SERPL-MCNC: 2.5 MG/DL — SIGNIFICANT CHANGE UP (ref 2.5–4.5)
PLATELET # BLD AUTO: 244 K/UL — SIGNIFICANT CHANGE UP (ref 150–400)
POTASSIUM SERPL-MCNC: 4.4 MMOL/L — SIGNIFICANT CHANGE UP (ref 3.5–5.3)
POTASSIUM SERPL-SCNC: 4.4 MMOL/L — SIGNIFICANT CHANGE UP (ref 3.5–5.3)
PROTHROM AB SERPL-ACNC: 15.7 SEC — HIGH (ref 10–12.9)
RBC # BLD: 3.62 M/UL — LOW (ref 3.8–5.2)
RBC # FLD: 13.3 % — SIGNIFICANT CHANGE UP (ref 10.3–14.5)
SODIUM SERPL-SCNC: 137 MMOL/L — SIGNIFICANT CHANGE UP (ref 135–145)
WBC # BLD: 4.85 K/UL — SIGNIFICANT CHANGE UP (ref 3.8–10.5)
WBC # FLD AUTO: 4.85 K/UL — SIGNIFICANT CHANGE UP (ref 3.8–10.5)

## 2020-02-20 PROCEDURE — 99233 SBSQ HOSP IP/OBS HIGH 50: CPT

## 2020-02-20 PROCEDURE — 99232 SBSQ HOSP IP/OBS MODERATE 35: CPT

## 2020-02-20 RX ADMIN — TAMSULOSIN HYDROCHLORIDE 0.4 MILLIGRAM(S): 0.4 CAPSULE ORAL at 21:59

## 2020-02-20 RX ADMIN — MEROPENEM 100 MILLIGRAM(S): 1 INJECTION INTRAVENOUS at 21:59

## 2020-02-20 RX ADMIN — MEROPENEM 100 MILLIGRAM(S): 1 INJECTION INTRAVENOUS at 13:14

## 2020-02-20 RX ADMIN — MEROPENEM 100 MILLIGRAM(S): 1 INJECTION INTRAVENOUS at 05:28

## 2020-02-20 NOTE — PROGRESS NOTE ADULT - ATTENDING COMMENTS
discussed with pt in detail, pt requests left ureteroscopy, possible removal of stone and insertion of stent. Procedure, Alternatives, Benefits and Risks discussed, all questions answered. Patient understands and requests to proceed with the plan and procedure.

## 2020-02-20 NOTE — PROGRESS NOTE ADULT - ASSESSMENT
54 yr old female with history of Kidney stones (Lithotripsy 2017 left side), UTI ( multi drug resistance) p/w a UTI after failing Bactrim treatment. CT showed left hydroureteronephrosis caused by a 3 mm mid to distal left ureteral stone w/ a suspected genitourinary infection and/or pyelonephritis.     left sided pyelonephritis & hydroureteronephrosis w/ ureteral stone  - c/w Merrem  - urine is growing GNR  - NGTD on blood cxs  - c/w IVF  - patient has asked to defer urologic intervention at this time, may do possible left ureteroscopy, insertion of stent if patient consents tomorrow   - c/w Flomax     Prophylaxis:  DVT: SCD  GI: PO diet

## 2020-02-20 NOTE — PROGRESS NOTE ADULT - SUBJECTIVE AND OBJECTIVE BOX
54 yr old female with history of Kidney stones (Lithotripsy 2017 left side), UTI ( multi drug resistance) p/w a UTI after failing Bactrim treatment. CT showed left hydroureteronephrosis caused by a 3 mm mid to distal left ureteral stone w/ a suspected genitourinary infection and/or pyelonephritis. She is lying in bed in NAD.    MEDICATIONS  (STANDING):  meropenem  IVPB 1000 milliGRAM(s) IV Intermittent every 8 hours  sodium chloride 0.45%. 1000 milliLiter(s) (100 mL/Hr) IV Continuous <Continuous>  tamsulosin 0.4 milliGRAM(s) Oral at bedtime    MEDICATIONS  (PRN):  acetaminophen   Tablet .. 650 milliGRAM(s) Oral every 6 hours PRN Temp greater or equal to 38C (100.4F), Mild Pain (1 - 3)  ondansetron Injectable 4 milliGRAM(s) IV Push every 6 hours PRN Nausea and/or Vomiting      Allergies    No Known Allergies    Intolerances        Vital Signs Last 24 Hrs  T(C): 37.2 (2020 16:44), Max: 37.2 (2020 11:20)  T(F): 98.9 (2020 16:44), Max: 98.9 (2020 11:20)  HR: 74 (2020 16:44) (64 - 74)  BP: 95/64 (2020 16:44) (90/59 - 105/71)  BP(mean): --  RR: 17 (2020 16:44) (17 - 18)  SpO2: 100% (2020 16:44) (98% - 100%)    PHYSICAL EXAM:  GENERAL: NAD, well-groomed, well-developed  HEAD:  Atraumatic, Normocephalic  EYES: EOMI, PERRLA   NECK: Supple   NERVOUS SYSTEM:  Alert & Oriented    CHEST/LUNG: Clear to auscultation bilaterally; No rales, rhonchi, wheezing, or rubs  HEART: Regular rate and rhythm; No murmurs, rubs, or gallops  ABDOMEN: Soft, Nontender, Nondistended; Bowel sounds present  Back: No flank tenderness  EXTREMITIES: No clubbing, cyanosis, or edema     LABS:                        10.6   4.85  )-----------( 244      ( 2020 07:29 )             31.9     02-    137  |  108  |  7   ----------------------------<  91  4.4   |  24  |  0.72    Ca    9.0      2020 07:29  Phos  2.5     02  Mg     2.4         TPro  8.8<H>  /  Alb  3.2<L>  /  TBili  0.5  /  DBili  x   /  AST  29  /  ALT  44  /  AlkPhos  132<H>  0219    PT/INR - ( 2020 07:29 )   PT: 15.7 sec;   INR: 1.39 ratio         PTT - ( :29 )  PTT:26.7 sec  Urinalysis Basic - ( 2020 02:00 )    Color: Yellow / Appearance: Clear / S.010 / pH: x  Gluc: x / Ketone: Small  / Bili: Negative / Urobili: Negative mg/dL   Blood: x / Protein: 30 mg/dL / Nitrite: Negative   Leuk Esterase: Moderate / RBC: 6-10 /HPF / WBC 26-50   Sq Epi: x / Non Sq Epi: Many / Bacteria: TNTC      CAPILLARY BLOOD GLUCOSE          Culture - Urine (collected 2020 09:26)  Source: .Urine Clean Catch (Midstream)  Preliminary Report (2020 07:28):    >100,000 CFU/ml Gram Negative Rods    Culture - Blood (collected 2020 09:07)  Source: .Blood Blood  Preliminary Report (2020 10:02):    No growth to date.    Culture - Blood (collected 2020 09:07)  Source: .Blood Blood  Preliminary Report (2020 10:02):    No growth to date.      RADIOLOGY & ADDITIONAL TESTS:

## 2020-02-20 NOTE — PROGRESS NOTE ADULT - SUBJECTIVE AND OBJECTIVE BOX
Patient seen and examined bedside resting comfortably.  No complaints offered. States she is feeling "better." Voiding spontaneously without difficulty. Denies hematuria or dysuria.   She wants to hold off on any urology surgery for now, states she would rather see her urologist in the city.   Denies pain, fever/chills, sob, chest pain.  Tmax 100.7F/24hr    T(F): 98.9 (02-20-20 @ 11:20), Max: 100.7 (02-19-20 @ 18:08)  HR: 64 (02-20-20 @ 11:20) (64 - 80)  BP: 105/71 (02-20-20 @ 11:20) (90/59 - 105/71)  RR: 17 (02-20-20 @ 11:20) (17 - 18)  SpO2: 98% (02-20-20 @ 11:20) (96% - 99%)    PHYSICAL EXAM:    General: NAD, alert and awake  Chest: nonlabored respirations, CTA b/l.  Abdomen: soft, NT/ND.   Extremities: Calf soft, nontender b/l.   : No suprapubic tenderness or bladder distention.  No CVA tenderness.     LABS:                        10.6   4.85  )-----------( 244      ( 20 Feb 2020 07:29 )             31.9   02-20    137  |  108  |  7   ----------------------------<  91  4.4   |  24  |  0.72    Ca    9.0      20 Feb 2020 07:29  Phos  2.5     02-20  Mg     2.4     02-20    TPro  8.8<H>  /  Alb  3.2<L>  /  TBili  0.5  /  DBili  x   /  AST  29  /  ALT  44  /  AlkPhos  132<H>  02-19  PT/INR - ( 20 Feb 2020 07:29 )   PT: 15.7 sec;   INR: 1.39 ratio         PTT - ( 20 Feb 2020 07:29 )  PTT:26.7 sec  I&O's Detail      A/P: 54 year old female with PMH of Kidney stones ( Lithotripsy 2017 left side) , UTI ( multi drug resistance) presented to ER c/o fever x 1 week. Admitted with pyelonephritis, Left ureteral stone, Left hydronephrosis.   Clinically improving on IV ABX, hemodynamically stable. Tmax 100.7/24hr  Urine cx: prelim GNR    -Patient wishes to hold off on any urologic procedure at this time and would rather be discharged to see her outpatient Urologist in the city  -Continue IV ABX, f/u final Urine culture/sensitivity report  -continue medical management and supportive care, IVF, strain urine, Flomax  -Monitor for temps, antipyretics PRN  -Trend renal function  -Will continue to follow, NPO p MN for possible left ureteroscopy, insertion of stent if patient consents Friday  -Discussed with patient who agrees and understands to plan as above  -Discussed with Dr. Camarena and Dr. Caicedo

## 2020-02-21 ENCOUNTER — RESULT REVIEW (OUTPATIENT)
Age: 55
End: 2020-02-21

## 2020-02-21 LAB
-  AMIKACIN: SIGNIFICANT CHANGE UP
-  AMPICILLIN/SULBACTAM: SIGNIFICANT CHANGE UP
-  AMPICILLIN: SIGNIFICANT CHANGE UP
-  AZTREONAM: SIGNIFICANT CHANGE UP
-  CEFAZOLIN: SIGNIFICANT CHANGE UP
-  CEFEPIME: SIGNIFICANT CHANGE UP
-  CEFOXITIN: SIGNIFICANT CHANGE UP
-  CEFTRIAXONE: SIGNIFICANT CHANGE UP
-  CIPROFLOXACIN: SIGNIFICANT CHANGE UP
-  GENTAMICIN: SIGNIFICANT CHANGE UP
-  IMIPENEM: SIGNIFICANT CHANGE UP
-  LEVOFLOXACIN: SIGNIFICANT CHANGE UP
-  MEROPENEM: SIGNIFICANT CHANGE UP
-  NITROFURANTOIN: SIGNIFICANT CHANGE UP
-  PIPERACILLIN/TAZOBACTAM: SIGNIFICANT CHANGE UP
-  TIGECYCLINE: SIGNIFICANT CHANGE UP
-  TOBRAMYCIN: SIGNIFICANT CHANGE UP
-  TRIMETHOPRIM/SULFAMETHOXAZOLE: SIGNIFICANT CHANGE UP
ANION GAP SERPL CALC-SCNC: 4 MMOL/L — LOW (ref 5–17)
BUN SERPL-MCNC: 9 MG/DL — SIGNIFICANT CHANGE UP (ref 7–23)
CALCIUM SERPL-MCNC: 9.3 MG/DL — SIGNIFICANT CHANGE UP (ref 8.5–10.1)
CHLORIDE SERPL-SCNC: 106 MMOL/L — SIGNIFICANT CHANGE UP (ref 96–108)
CO2 SERPL-SCNC: 29 MMOL/L — SIGNIFICANT CHANGE UP (ref 22–31)
CREAT SERPL-MCNC: 0.7 MG/DL — SIGNIFICANT CHANGE UP (ref 0.5–1.3)
CULTURE RESULTS: SIGNIFICANT CHANGE UP
GLUCOSE SERPL-MCNC: 94 MG/DL — SIGNIFICANT CHANGE UP (ref 70–99)
HCT VFR BLD CALC: 31.9 % — LOW (ref 34.5–45)
HGB BLD-MCNC: 10.5 G/DL — LOW (ref 11.5–15.5)
MCHC RBC-ENTMCNC: 29.1 PG — SIGNIFICANT CHANGE UP (ref 27–34)
MCHC RBC-ENTMCNC: 32.9 GM/DL — SIGNIFICANT CHANGE UP (ref 32–36)
MCV RBC AUTO: 88.4 FL — SIGNIFICANT CHANGE UP (ref 80–100)
METHOD TYPE: SIGNIFICANT CHANGE UP
NRBC # BLD: 0 /100 WBCS — SIGNIFICANT CHANGE UP (ref 0–0)
ORGANISM # SPEC MICROSCOPIC CNT: SIGNIFICANT CHANGE UP
ORGANISM # SPEC MICROSCOPIC CNT: SIGNIFICANT CHANGE UP
PLATELET # BLD AUTO: 260 K/UL — SIGNIFICANT CHANGE UP (ref 150–400)
POTASSIUM SERPL-MCNC: 3.9 MMOL/L — SIGNIFICANT CHANGE UP (ref 3.5–5.3)
POTASSIUM SERPL-SCNC: 3.9 MMOL/L — SIGNIFICANT CHANGE UP (ref 3.5–5.3)
RBC # BLD: 3.61 M/UL — LOW (ref 3.8–5.2)
RBC # FLD: 12.9 % — SIGNIFICANT CHANGE UP (ref 10.3–14.5)
SODIUM SERPL-SCNC: 139 MMOL/L — SIGNIFICANT CHANGE UP (ref 135–145)
SPECIMEN SOURCE: SIGNIFICANT CHANGE UP
WBC # BLD: 4.66 K/UL — SIGNIFICANT CHANGE UP (ref 3.8–10.5)
WBC # FLD AUTO: 4.66 K/UL — SIGNIFICANT CHANGE UP (ref 3.8–10.5)

## 2020-02-21 PROCEDURE — 74420 UROGRAPHY RTRGR +-KUB: CPT | Mod: 26

## 2020-02-21 PROCEDURE — 52344 CYSTO/URETERO STRICTURE TX: CPT | Mod: 59,LT

## 2020-02-21 PROCEDURE — 52352 CYSTOURETERO W/STONE REMOVE: CPT | Mod: LT

## 2020-02-21 PROCEDURE — 99233 SBSQ HOSP IP/OBS HIGH 50: CPT

## 2020-02-21 PROCEDURE — 88300 SURGICAL PATH GROSS: CPT | Mod: 26

## 2020-02-21 PROCEDURE — 52332 CYSTOSCOPY AND TREATMENT: CPT | Mod: LT

## 2020-02-21 RX ORDER — TAMSULOSIN HYDROCHLORIDE 0.4 MG/1
0.4 CAPSULE ORAL AT BEDTIME
Refills: 0 | Status: DISCONTINUED | OUTPATIENT
Start: 2020-02-21 | End: 2020-02-22

## 2020-02-21 RX ORDER — SODIUM CHLORIDE 9 MG/ML
1000 INJECTION, SOLUTION INTRAVENOUS
Refills: 0 | Status: DISCONTINUED | OUTPATIENT
Start: 2020-02-21 | End: 2020-02-21

## 2020-02-21 RX ORDER — MEROPENEM 1 G/30ML
1000 INJECTION INTRAVENOUS EVERY 8 HOURS
Refills: 0 | Status: DISCONTINUED | OUTPATIENT
Start: 2020-02-21 | End: 2020-02-21

## 2020-02-21 RX ORDER — OXYCODONE AND ACETAMINOPHEN 5; 325 MG/1; MG/1
1 TABLET ORAL EVERY 6 HOURS
Refills: 0 | Status: DISCONTINUED | OUTPATIENT
Start: 2020-02-21 | End: 2020-02-22

## 2020-02-21 RX ORDER — ONDANSETRON 8 MG/1
4 TABLET, FILM COATED ORAL EVERY 6 HOURS
Refills: 0 | Status: DISCONTINUED | OUTPATIENT
Start: 2020-02-21 | End: 2020-02-22

## 2020-02-21 RX ORDER — ACETAMINOPHEN 500 MG
650 TABLET ORAL EVERY 6 HOURS
Refills: 0 | Status: DISCONTINUED | OUTPATIENT
Start: 2020-02-21 | End: 2020-02-22

## 2020-02-21 RX ORDER — FENTANYL CITRATE 50 UG/ML
25 INJECTION INTRAVENOUS
Refills: 0 | Status: DISCONTINUED | OUTPATIENT
Start: 2020-02-21 | End: 2020-02-21

## 2020-02-21 RX ORDER — SODIUM CHLORIDE 9 MG/ML
1000 INJECTION, SOLUTION INTRAVENOUS
Refills: 0 | Status: DISCONTINUED | OUTPATIENT
Start: 2020-02-21 | End: 2020-02-22

## 2020-02-21 RX ORDER — CEFTRIAXONE 500 MG/1
1000 INJECTION, POWDER, FOR SOLUTION INTRAMUSCULAR; INTRAVENOUS EVERY 24 HOURS
Refills: 0 | Status: DISCONTINUED | OUTPATIENT
Start: 2020-02-21 | End: 2020-02-22

## 2020-02-21 RX ADMIN — MEROPENEM 100 MILLIGRAM(S): 1 INJECTION INTRAVENOUS at 13:10

## 2020-02-21 RX ADMIN — MEROPENEM 100 MILLIGRAM(S): 1 INJECTION INTRAVENOUS at 05:21

## 2020-02-21 RX ADMIN — SODIUM CHLORIDE 100 MILLILITER(S): 9 INJECTION, SOLUTION INTRAVENOUS at 15:24

## 2020-02-21 RX ADMIN — TAMSULOSIN HYDROCHLORIDE 0.4 MILLIGRAM(S): 0.4 CAPSULE ORAL at 21:31

## 2020-02-21 RX ADMIN — CEFTRIAXONE 100 MILLIGRAM(S): 500 INJECTION, POWDER, FOR SOLUTION INTRAMUSCULAR; INTRAVENOUS at 19:26

## 2020-02-21 RX ADMIN — SODIUM CHLORIDE 50 MILLILITER(S): 9 INJECTION, SOLUTION INTRAVENOUS at 11:58

## 2020-02-21 NOTE — BRIEF OPERATIVE NOTE - NSICDXBRIEFPROCEDURE_GEN_ALL_CORE_FT
PROCEDURES:  Retrograde pyelogram 21-Feb-2020 10:32:47  Jose Camarena  Insertion of double-J stent into ureter 21-Feb-2020 10:32:38  Jose Camarena  Ureteroscopy with removal of calculus 21-Feb-2020 10:32:18  Jose Camarena  Cystourethroscopy with ureteroscopy with ureteral stricture treatment 21-Feb-2020 10:31:59  Jose Camarena

## 2020-02-21 NOTE — BRIEF OPERATIVE NOTE - NSICDXBRIEFPOSTOP_GEN_ALL_CORE_FT
POST-OP DIAGNOSIS:  Pyelonephritis 21-Feb-2020 10:35:06  Jose Camarena  Hydronephrosis due to obstruction of ureter 21-Feb-2020 10:34:51  Jose Camarena  Left ureteral stone 21-Feb-2020 10:34:34  Jose Camarena  Ureteral stricture, left 21-Feb-2020 10:34:27  Jose Camarena

## 2020-02-21 NOTE — PROGRESS NOTE ADULT - ASSESSMENT
54 yr old female with history of Kidney stones (Lithotripsy 2017 left side), UTI ( multi drug resistance) p/w a UTI after failing Bactrim treatment. CT showed left hydroureteronephrosis caused by a 3 mm mid to distal left ureteral stone w/ a suspected genitourinary infection and/or pyelonephritis.     left sided pyelonephritis & hydroureteronephrosis w/ ureteral stone  - c/w Merrem  - urine is growing GNR  - NGTD on blood cxs  - c/w IVF  - status post cystourethroscopy with ureteroscopy w/ removal of calculus & ureteral stent placement on 1/21  - c/w Flomax     Prophylaxis:  DVT: SCD  GI: PO diet 54 yr old female with history of Kidney stones (Lithotripsy 2017 left side), UTI ( multi drug resistance) p/w a UTI after failing Bactrim treatment. CT showed left hydroureteronephrosis caused by a 3 mm mid to distal left ureteral stone w/ a suspected genitourinary infection and/or pyelonephritis.     left sided pyelonephritis & hydroureteronephrosis w/ ureteral stone  - switch Merrem to Rocephin  - urine grew E. coli  - NGTD on blood cxs  - c/w IVF  - status post cystourethroscopy with ureteroscopy w/ removal of calculus & ureteral stent placement on 1/21  - c/w Flomax     Prophylaxis:  DVT: SCD  GI: PO diet

## 2020-02-21 NOTE — PROGRESS NOTE ADULT - SUBJECTIVE AND OBJECTIVE BOX
54 yr old female with history of Kidney stones (Lithotripsy 2017 left side), UTI ( multi drug resistance) p/w a UTI after failing Bactrim treatment. CT showed left hydroureteronephrosis caused by a 3 mm mid to distal left ureteral stone w/ a suspected genitourinary infection and/or pyelonephritis. She is lying in bed in NAD.     MEDICATIONS  (STANDING):  meropenem  IVPB 1000 milliGRAM(s) IV Intermittent every 8 hours  sodium chloride 0.45%. 1000 milliLiter(s) (100 mL/Hr) IV Continuous <Continuous>  tamsulosin 0.4 milliGRAM(s) Oral at bedtime    MEDICATIONS  (PRN):  acetaminophen   Tablet .. 650 milliGRAM(s) Oral every 6 hours PRN Temp greater or equal to 38C (100.4F), Mild Pain (1 - 3)  ondansetron Injectable 4 milliGRAM(s) IV Push every 6 hours PRN Nausea and/or Vomiting  oxycodone    5 mG/acetaminophen 325 mG 1 Tablet(s) Oral every 6 hours PRN Moderate Pain (4 - 6)      Allergies    No Known Allergies    Intolerances        Vital Signs Last 24 Hrs  T(C): 36.1 (21 Feb 2020 15:05), Max: 36.8 (21 Feb 2020 00:08)  T(F): 97 (21 Feb 2020 15:05), Max: 98.3 (21 Feb 2020 00:08)  HR: 76 (21 Feb 2020 15:05) (55 - 84)  BP: 91/66 (21 Feb 2020 15:05) (86/49 - 126/82)  BP(mean): --  RR: 18 (21 Feb 2020 15:05) (12 - 18)  SpO2: 99% (21 Feb 2020 15:05) (96% - 100%)    PHYSICAL EXAM:  GENERAL: NAD, well-groomed, well-developed  HEAD:  Atraumatic, Normocephalic  EYES: EOMI, PERRLA   NECK: Supple   NERVOUS SYSTEM:  Alert & Oriented    CHEST/LUNG: Clear to auscultation bilaterally; No rales, rhonchi, wheezing, or rubs  HEART: Regular rate and rhythm; No murmurs, rubs, or gallops  ABDOMEN: Soft, Nontender, Nondistended; Bowel sounds present  Back: No flank tenderness  EXTREMITIES: No clubbing, cyanosis, or edema       LABS:                        10.5   4.66  )-----------( 260      ( 21 Feb 2020 08:24 )             31.9     02-21    139  |  106  |  9   ----------------------------<  94  3.9   |  29  |  0.70    Ca    9.3      21 Feb 2020 08:24  Phos  2.5     02-20  Mg     2.4     02-20      PT/INR - ( 20 Feb 2020 07:29 )   PT: 15.7 sec;   INR: 1.39 ratio         PTT - ( 20 Feb 2020 07:29 )  PTT:26.7 sec    CAPILLARY BLOOD GLUCOSE          Culture - Urine (collected 19 Feb 2020 09:26)  Source: .Urine Clean Catch (Midstream)  Final Report (21 Feb 2020 11:33):    >100,000 CFU/ml Escherichia coli  Organism: Escherichia coli (21 Feb 2020 11:33)  Organism: Escherichia coli (21 Feb 2020 11:33)    Culture - Blood (collected 19 Feb 2020 09:07)  Source: .Blood Blood  Preliminary Report (20 Feb 2020 10:02):    No growth to date.    Culture - Blood (collected 19 Feb 2020 09:07)  Source: .Blood Blood  Preliminary Report (20 Feb 2020 10:02):    No growth to date.      RADIOLOGY & ADDITIONAL TESTS:    02-21-20 @ 07:01  -  02-21-20 @ 18:43  --------------------------------------------------------  IN:    lactated ringers.: 50 mL  Total IN: 50 mL    OUT:    Indwelling Catheter - Urethral: 1520 mL  Total OUT: 1520 mL    Total NET: -1470 mL

## 2020-02-21 NOTE — BRIEF OPERATIVE NOTE - NSICDXBRIEFPREOP_GEN_ALL_CORE_FT
PRE-OP DIAGNOSIS:  Pyelonephritis 21-Feb-2020 10:34:02  Jose Camarena  Left ureteral stone 21-Feb-2020 10:33:48  Jose Camarena  Hydronephrosis with ureteral stricture 21-Feb-2020 10:33:39  Jose Camarena

## 2020-02-21 NOTE — PROGRESS NOTE ADULT - SUBJECTIVE AND OBJECTIVE BOX
Urology Preop Note    Patient is a 54y old  Female who presents with a chief complaint of fever x 7 days (20 Feb 2020 18:21)    Diagnosis: Left ureteral stone, Left hydronephrosis  Procedure: Left ureteroscopy, possible removal of stone, insertion of stent with image  Surgeon: Dr. Camarena                          10.6   4.85  )-----------( 244      ( 20 Feb 2020 07:29 )             31.9     02-20    137  |  108  |  7   ----------------------------<  91  4.4   |  24  |  0.72    Ca    9.0      20 Feb 2020 07:29  Phos  2.5     02-20  Mg     2.4     02-20      PT/INR - ( 20 Feb 2020 07:29 )   PT: 15.7 sec;   INR: 1.39 ratio         PTT - ( 20 Feb 2020 07:29 )  PTT:26.7 sec    A/P: 54 year old female with PMH of Kidney stones ( Lithotripsy 2017 left side) , UTI ( multi drug resistance) presented to ER c/o fever x 1 week. Admitted with pyelonephritis, Left ureteral stone, Left hydronephrosis.   Clinically improving on IV ABX, hemodynamically stable. Afebrile.   Urine cx: prelim GNR    [X] T&S  [X ] CBC  [X ] BMP  [ X] PT/PTT/INR  [X ] Urinalysis  [X ] Chest X-ray  [ X] NPO/IVF  [X ] Clearance  [X ] Added on to OR Schedule  [X ] Anti-coagulation held  Consent to be obtained by Dr. Camarena

## 2020-02-22 ENCOUNTER — TRANSCRIPTION ENCOUNTER (OUTPATIENT)
Age: 55
End: 2020-02-22

## 2020-02-22 VITALS
HEART RATE: 68 BPM | RESPIRATION RATE: 17 BRPM | TEMPERATURE: 98 F | OXYGEN SATURATION: 100 % | SYSTOLIC BLOOD PRESSURE: 108 MMHG | DIASTOLIC BLOOD PRESSURE: 73 MMHG

## 2020-02-22 LAB
ANION GAP SERPL CALC-SCNC: 8 MMOL/L — SIGNIFICANT CHANGE UP (ref 5–17)
BASOPHILS # BLD AUTO: 0 K/UL — SIGNIFICANT CHANGE UP (ref 0–0.2)
BASOPHILS NFR BLD AUTO: 0 % — SIGNIFICANT CHANGE UP (ref 0–2)
BUN SERPL-MCNC: 11 MG/DL — SIGNIFICANT CHANGE UP (ref 7–23)
CALCIUM SERPL-MCNC: 9.3 MG/DL — SIGNIFICANT CHANGE UP (ref 8.5–10.1)
CHLORIDE SERPL-SCNC: 108 MMOL/L — SIGNIFICANT CHANGE UP (ref 96–108)
CO2 SERPL-SCNC: 26 MMOL/L — SIGNIFICANT CHANGE UP (ref 22–31)
CREAT SERPL-MCNC: 0.66 MG/DL — SIGNIFICANT CHANGE UP (ref 0.5–1.3)
CULTURE RESULTS: NO GROWTH — SIGNIFICANT CHANGE UP
CULTURE RESULTS: NO GROWTH — SIGNIFICANT CHANGE UP
EOSINOPHIL # BLD AUTO: 0 K/UL — SIGNIFICANT CHANGE UP (ref 0–0.5)
EOSINOPHIL NFR BLD AUTO: 0 % — SIGNIFICANT CHANGE UP (ref 0–6)
GLUCOSE SERPL-MCNC: 105 MG/DL — HIGH (ref 70–99)
HCT VFR BLD CALC: 32.3 % — LOW (ref 34.5–45)
HGB BLD-MCNC: 10.7 G/DL — LOW (ref 11.5–15.5)
IMM GRANULOCYTES NFR BLD AUTO: 0.3 % — SIGNIFICANT CHANGE UP (ref 0–1.5)
LYMPHOCYTES # BLD AUTO: 1.78 K/UL — SIGNIFICANT CHANGE UP (ref 1–3.3)
LYMPHOCYTES # BLD AUTO: 27 % — SIGNIFICANT CHANGE UP (ref 13–44)
MAGNESIUM SERPL-MCNC: 2.4 MG/DL — SIGNIFICANT CHANGE UP (ref 1.6–2.6)
MCHC RBC-ENTMCNC: 28.9 PG — SIGNIFICANT CHANGE UP (ref 27–34)
MCHC RBC-ENTMCNC: 33.1 GM/DL — SIGNIFICANT CHANGE UP (ref 32–36)
MCV RBC AUTO: 87.3 FL — SIGNIFICANT CHANGE UP (ref 80–100)
MONOCYTES # BLD AUTO: 0.46 K/UL — SIGNIFICANT CHANGE UP (ref 0–0.9)
MONOCYTES NFR BLD AUTO: 7 % — SIGNIFICANT CHANGE UP (ref 2–14)
NEUTROPHILS # BLD AUTO: 4.33 K/UL — SIGNIFICANT CHANGE UP (ref 1.8–7.4)
NEUTROPHILS NFR BLD AUTO: 65.7 % — SIGNIFICANT CHANGE UP (ref 43–77)
NRBC # BLD: 0 /100 WBCS — SIGNIFICANT CHANGE UP (ref 0–0)
PHOSPHATE SERPL-MCNC: 3.5 MG/DL — SIGNIFICANT CHANGE UP (ref 2.5–4.5)
PLATELET # BLD AUTO: 281 K/UL — SIGNIFICANT CHANGE UP (ref 150–400)
POTASSIUM SERPL-MCNC: 4.2 MMOL/L — SIGNIFICANT CHANGE UP (ref 3.5–5.3)
POTASSIUM SERPL-SCNC: 4.2 MMOL/L — SIGNIFICANT CHANGE UP (ref 3.5–5.3)
RBC # BLD: 3.7 M/UL — LOW (ref 3.8–5.2)
RBC # FLD: 12.9 % — SIGNIFICANT CHANGE UP (ref 10.3–14.5)
SODIUM SERPL-SCNC: 142 MMOL/L — SIGNIFICANT CHANGE UP (ref 135–145)
SPECIMEN SOURCE: SIGNIFICANT CHANGE UP
SPECIMEN SOURCE: SIGNIFICANT CHANGE UP
WBC # BLD: 6.59 K/UL — SIGNIFICANT CHANGE UP (ref 3.8–10.5)
WBC # FLD AUTO: 6.59 K/UL — SIGNIFICANT CHANGE UP (ref 3.8–10.5)

## 2020-02-22 PROCEDURE — 99239 HOSP IP/OBS DSCHRG MGMT >30: CPT

## 2020-02-22 RX ORDER — TAMSULOSIN HYDROCHLORIDE 0.4 MG/1
1 CAPSULE ORAL
Qty: 45 | Refills: 0
Start: 2020-02-22 | End: 2020-04-06

## 2020-02-22 RX ORDER — CEFUROXIME AXETIL 250 MG
1 TABLET ORAL
Qty: 20 | Refills: 0
Start: 2020-02-22 | End: 2020-03-02

## 2020-02-22 RX ADMIN — CEFTRIAXONE 100 MILLIGRAM(S): 500 INJECTION, POWDER, FOR SOLUTION INTRAMUSCULAR; INTRAVENOUS at 18:02

## 2020-02-22 RX ADMIN — SODIUM CHLORIDE 100 MILLILITER(S): 9 INJECTION, SOLUTION INTRAVENOUS at 05:59

## 2020-02-22 NOTE — DISCHARGE NOTE NURSING/CASE MANAGEMENT/SOCIAL WORK - NSDCPNDISPN_GEN_ALL_CORE
Activities of daily living, including home environment that might     exacerbate pain or reduce effectiveness of the pain management plan of care as well as strategies to address these issues/Education provided on the pain management plan of care/Safe use, storage and disposal of opioids when prescribed/Side effects of pain management treatment/Opioids not applicable/not prescribed

## 2020-02-22 NOTE — DISCHARGE NOTE PROVIDER - NSDCMRMEDTOKEN_GEN_ALL_CORE_FT
cefuroxime 500 mg oral tablet: 1 tab(s) orally 2 times a day   tamsulosin 0.4 mg oral capsule: 1 cap(s) orally once a day (at bedtime)

## 2020-02-22 NOTE — DISCHARGE NOTE NURSING/CASE MANAGEMENT/SOCIAL WORK - PATIENT PORTAL LINK FT
You can access the FollowMyHealth Patient Portal offered by [facility] by registering at the following website: [email or web address]. By joining [facility]’s FollowMyHealth portal, you will also be able to view your health information using other applications (apps) compatible with our system.

## 2020-02-22 NOTE — PROGRESS NOTE ADULT - SUBJECTIVE AND OBJECTIVE BOX
Patient seen and examined bedside resting comfortably.  No complaints offered. Admits to slight discomfort when voiding, but overall feeling well. Wants to go home.   Voiding spontaneously without difficulty since hotra removal last night.   Denies hematuria and dysuria. Denies nausea and vomiting. Tolerating diet.  Denies fever/chills, chest pain, dyspnea, cough.    T(F): 97.3 (02-22-20 @ 07:17), Max: 98.1 (02-22-20 @ 00:10)  HR: 63 (02-22-20 @ 07:17) (55 - 84)  BP: 101/67 (02-22-20 @ 07:17) (84/50 - 126/82)  RR: 18 (02-22-20 @ 07:17) (12 - 19)  SpO2: 100% (02-22-20 @ 07:17) (98% - 100%)    PHYSICAL EXAM:    General: NAD, alert and awake  Chest: nonlabored respirations, CTA b/l.  Abdomen: soft, NT/ND.   Extremities: Calf soft, nontender b/l.   : No suprapubic tenderness or bladder distention. No CVA tenderness.     LABS:                        10.7   6.59  )-----------( 281      ( 22 Feb 2020 07:52 )             32.3   02-22    142  |  108  |  11  ----------------------------<  105<H>  4.2   |  26  |  0.66    Ca    9.3      22 Feb 2020 07:52  Phos  3.5     02-22  Mg     2.4     02-22      I&O's Detail    21 Feb 2020 07:01  -  22 Feb 2020 07:00  --------------------------------------------------------  IN:    lactated ringers.: 50 mL  Total IN: 50 mL    OUT:    Indwelling Catheter - Urethral: 1520 mL  Total OUT: 1520 mL    Total NET: -1470 mL    Culture - Urine (02.19.20 @ 09:26)    -  Amikacin: S <=16    -  Ampicillin: R >16 These ampicillin results predict results for amoxicillin    -  Ampicillin/Sulbactam: R >16/8 Enterobacter, Citrobacter, and Serratia may develop resistance during prolonged therapy (3-4 days)    -  Aztreonam: S <=4    -  Cefazolin: S <=8 (MIC_CL_COM_ENTERIC_CEFAZU) For uncomplicated UTI with K. pneumoniae, E. coli, or P. mirablis: MICHELLE <=16 is sensitive and MICHELLE >=32 is resistant. This also predicts results for oral agents cefaclor, cefdinir, cefpodoxime, cefprozil, cefuroxime axetil, cephalexin and locarbef for uncomplicated UTI. Note that some isolates may be susceptible to these agents while testing resistant to cefazolin.    -  Cefepime: S <=4    -  Cefoxitin: S <=8    -  Ceftriaxone: S <=1 Enterobacter, Citrobacter, and Serratia may develop resistance during prolonged therapy    -  Ciprofloxacin: R >2    -  Gentamicin: S <=4    -  Imipenem: S <=1    -  Levofloxacin: R >4    -  Meropenem: S <=1    -  Nitrofurantoin: S <=32 Should not be used to treat pyelonephritis    -  Piperacillin/Tazobactam: S <=16    -  Tigecycline: S <=2    -  Tobramycin: S <=4    -  Trimethoprim/Sulfamethoxazole: R >2/38    Specimen Source: .Urine Clean Catch (Midstream)    Culture Results:   >100,000 CFU/ml Escherichia coli    Organism Identification: Escherichia coli    Organism: Escherichia coli    Method Type: MICHELLE      A/P: 54 year old female with PMH of Kidney stones ( Lithotripsy 2017 left side) , UTI ( multi drug resistance) presented to ER c/o fever x 1 week. Admitted with pyelonephritis, Left ureteral stone, Left hydronephrosis, now s/p Left ureteroscopy, stone removal and stent insertion POD #1. Horta removed, voiding without difficulty.   Clinically improving, hemodynamically stable. Afebrile.     Urine cx results reviewed with Dr. Camarena, As per discussion- >  stable for discharge with Ceftin 500mg PO BID x10 days  Follow up with Dr. Camarena in the office for stent removal in 6 weeks.   Continue current medical management and postop care, analgesia PRN  DVT ppx, OOB to ambulated

## 2020-02-22 NOTE — DISCHARGE NOTE PROVIDER - NSDCCPCAREPLAN_GEN_ALL_CORE_FT
PRINCIPAL DISCHARGE DIAGNOSIS  Diagnosis: Pyelonephritis  Assessment and Plan of Treatment:       SECONDARY DISCHARGE DIAGNOSES  Diagnosis: Ureteral stone  Assessment and Plan of Treatment:

## 2020-02-22 NOTE — DISCHARGE NOTE PROVIDER - CARE PROVIDER_API CALL
Your PMD,   Phone: (   )    -  Fax: (   )    -  Follow Up Time:     Jose Camarena)  Urology  [address] [facility], [address] [place] [address]  Phone: [phone]  Fax: [phone]  Follow Up Time:

## 2020-02-22 NOTE — DISCHARGE NOTE PROVIDER - HOSPITAL COURSE
54 yr old female with history of Kidney stones (Lithotripsy 2017 left side), UTI ( multi drug resistance) p/w a UTI after failing Bactrim treatment. CT showed left hydroureteronephrosis caused by a 3 mm mid to distal left ureteral stone w/ a suspected genitourinary infection and/or pyelonephritis. Pt had a cystourethroscopy with ureteroscopy w/ removal of calculus & ureteral stent placement on 1/21. Pt was initially put on Merrem, but when her urine grew E. coli, she was switched to Rocephin. She has been cleared by urology for discharge on Ceftin and will need to follow up with Dr. Camarena in the office for stent removal in 6 weeks.          Discharge time: 43 minutes

## 2020-02-24 LAB
CULTURE RESULTS: SIGNIFICANT CHANGE UP
CULTURE RESULTS: SIGNIFICANT CHANGE UP
SPECIMEN SOURCE: SIGNIFICANT CHANGE UP
SPECIMEN SOURCE: SIGNIFICANT CHANGE UP
SURGICAL PATHOLOGY STUDY: SIGNIFICANT CHANGE UP

## 2020-02-26 LAB — NIDUS STONE QN: SIGNIFICANT CHANGE UP

## 2020-02-27 DIAGNOSIS — B96.20 UNSPECIFIED ESCHERICHIA COLI [E. COLI] AS THE CAUSE OF DISEASES CLASSIFIED ELSEWHERE: ICD-10-CM

## 2020-02-27 DIAGNOSIS — Z87.440 PERSONAL HISTORY OF URINARY (TRACT) INFECTIONS: ICD-10-CM

## 2020-02-27 DIAGNOSIS — N13.6 PYONEPHROSIS: ICD-10-CM

## 2020-02-27 DIAGNOSIS — Z87.442 PERSONAL HISTORY OF URINARY CALCULI: ICD-10-CM

## 2020-02-27 DIAGNOSIS — N12 TUBULO-INTERSTITIAL NEPHRITIS, NOT SPECIFIED AS ACUTE OR CHRONIC: ICD-10-CM

## 2020-02-28 PROBLEM — Z00.00 ENCOUNTER FOR PREVENTIVE HEALTH EXAMINATION: Status: ACTIVE | Noted: 2020-02-28

## 2020-02-28 PROBLEM — N20.0 CALCULUS OF KIDNEY: Chronic | Status: ACTIVE | Noted: 2020-02-19

## 2020-03-09 ENCOUNTER — APPOINTMENT (OUTPATIENT)
Dept: UROLOGY | Facility: CLINIC | Age: 55
End: 2020-03-09
Payer: COMMERCIAL

## 2020-03-09 VITALS
DIASTOLIC BLOOD PRESSURE: 70 MMHG | BODY MASS INDEX: 23.04 KG/M2 | TEMPERATURE: 98.3 F | WEIGHT: 130 LBS | SYSTOLIC BLOOD PRESSURE: 108 MMHG | HEIGHT: 63 IN | HEART RATE: 60 BPM

## 2020-03-09 DIAGNOSIS — N13.30 UNSPECIFIED HYDRONEPHROSIS: ICD-10-CM

## 2020-03-09 DIAGNOSIS — N13.5 CROSSING VESSEL AND STRICTURE OF URETER W/OUT HYDRONEPHROSIS: ICD-10-CM

## 2020-03-09 DIAGNOSIS — Z78.9 OTHER SPECIFIED HEALTH STATUS: ICD-10-CM

## 2020-03-09 PROCEDURE — 99213 OFFICE O/P EST LOW 20 MIN: CPT

## 2020-03-09 NOTE — HISTORY OF PRESENT ILLNESS
[FreeTextEntry1] : s/p Right urterolithotripsy, ureteroplasty and insertion of stent 02/21/2020. C/o dysuria, hematuria and left flank discomfort.\par

## 2020-03-10 LAB
APPEARANCE: CLEAR
BACTERIA: NEGATIVE
BILIRUBIN URINE: NEGATIVE
BLOOD URINE: ABNORMAL
COLOR: COLORLESS
GLUCOSE QUALITATIVE U: NEGATIVE
HYALINE CASTS: 0 /LPF
KETONES URINE: NEGATIVE
LEUKOCYTE ESTERASE URINE: NEGATIVE
MICROSCOPIC-UA: NORMAL
NITRITE URINE: NEGATIVE
PH URINE: 8
PROTEIN URINE: NORMAL
RED BLOOD CELLS URINE: 70 /HPF
SPECIFIC GRAVITY URINE: 1.01
SQUAMOUS EPITHELIAL CELLS: 3 /HPF
UROBILINOGEN URINE: NORMAL
WHITE BLOOD CELLS URINE: 3 /HPF

## 2020-03-13 LAB — BACTERIA UR CULT: ABNORMAL

## 2020-03-26 ENCOUNTER — APPOINTMENT (OUTPATIENT)
Dept: UROLOGY | Facility: CLINIC | Age: 55
End: 2020-03-26
Payer: COMMERCIAL

## 2020-03-26 VITALS — SYSTOLIC BLOOD PRESSURE: 108 MMHG | HEART RATE: 85 BPM | DIASTOLIC BLOOD PRESSURE: 68 MMHG | TEMPERATURE: 98 F

## 2020-03-26 PROCEDURE — 99213 OFFICE O/P EST LOW 20 MIN: CPT | Mod: 25

## 2020-03-26 PROCEDURE — 52315 CYSTOSCOPY AND TREATMENT: CPT

## 2020-03-26 NOTE — ADDENDUM
[FreeTextEntry1] : I, Julia Innocent, acted solely as a scribe for Dr. Jose Camarena on this date, 03/26/2020.\par \par All medical record entries made by the Scribe were at my Dr. Jose Camarena direction and personally dictated by me on, 03/26/2020. I have reviewed the chart and agree that the record accurately reflects my personal performance of the history, physical exam, assessment and plan. I have also personally directed, reviewed and agreed with the chart.\par

## 2020-03-26 NOTE — PROCEDURE
[Cysto - Stent Removal] : cystoscopy for stent removal [Patient] : the patient [Consent Obtained] : written consent was obtained prior to the procedure and is detailed in the patient's record [Allergies Reviewed] : Allergies reviewed [None] : none [Supine] : supine [Betadine] : with betadine [Flexible Cystoscope] : A flexible cystoscope was used to visualize the urethra and bladder. [No Complications] : There were no complications [Tolerated Well] : the patient tolerated the procedure well [Post procedure instructions and information given] : post procedure instructions and information given and reviewed with patient. [0] : 0 [Kidney/Ureteral Stones] : nephrolithiasis [Other ___] : [unfilled] [] : had clear efflux of urine [Intraurethral 2% Lidocaine Gel ___ (cc)] : [unfilled] cc of 2% Lidocaine Gel was administered intraurethrally [Last Aspirin Dose ____] : Reviewed last aspirin dose: [unfilled] [Valves/Prosthetics ___] : Pt has the following valves and/or prosthetics: [unfilled] [Time Patient Entered Room ___] : patient entered room: [unfilled] [Time Out ___] : time out occurred at [unfilled] as per policy [Procedure Start Time ___] : procedure start time: [unfilled] [Normal] : was normal [No Bladder Tumor] : No bladder tumor visualized [Procedure Stop Time ___] : procedure stop time: [unfilled] [Time Patient Exited Room ___] : patient exited room: [unfilled] [Pt took necessary Preparations and Antibiotics for Procedure] : Pt did not take necessary preparations and/or antibiotics for procedure [FreeTextEntry3] : Heydi Stevenson MA

## 2020-03-26 NOTE — HISTORY OF PRESENT ILLNESS
[FreeTextEntry1] : s/p left ureterolithotripsy, ureteroplasty and insertion of stent 02/21/2020. C/o dysuria, hematuria and left flank discomfort.\par \par 03/26/2020: 5 weeks post stent insertion\par Stent removed today.\par Will follow up in one month\par

## 2020-04-30 ENCOUNTER — APPOINTMENT (OUTPATIENT)
Dept: UROLOGY | Facility: CLINIC | Age: 55
End: 2020-04-30
Payer: COMMERCIAL

## 2020-04-30 VITALS — HEART RATE: 69 BPM | DIASTOLIC BLOOD PRESSURE: 82 MMHG | TEMPERATURE: 98.1 F | SYSTOLIC BLOOD PRESSURE: 119 MMHG

## 2020-04-30 DIAGNOSIS — R31.0 GROSS HEMATURIA: ICD-10-CM

## 2020-04-30 DIAGNOSIS — Z87.42 PERSONAL HISTORY OF OTHER DISEASES OF THE FEMALE GENITAL TRACT: ICD-10-CM

## 2020-04-30 PROCEDURE — 51798 US URINE CAPACITY MEASURE: CPT

## 2020-04-30 PROCEDURE — 99213 OFFICE O/P EST LOW 20 MIN: CPT | Mod: 25

## 2020-04-30 RX ORDER — LEVOFLOXACIN 500 MG/1
500 TABLET, FILM COATED ORAL DAILY
Qty: 7 | Refills: 0 | Status: DISCONTINUED | COMMUNITY
Start: 2020-03-31 | End: 2020-04-30

## 2020-04-30 NOTE — PHYSICAL EXAM
[General Appearance - Well Developed] : well developed [General Appearance - Well Nourished] : well nourished [Normal Appearance] : normal appearance [General Appearance - In No Acute Distress] : no acute distress [Abdomen Soft] : soft [Well Groomed] : well groomed [Costovertebral Angle Tenderness] : no ~M costovertebral angle tenderness [Abdomen Tenderness] : non-tender [Urinary Bladder Findings] : the bladder was normal on palpation [Edema] : no peripheral edema [Exaggerated Use Of Accessory Muscles For Inspiration] : no accessory muscle use [] : no respiratory distress [Respiration, Rhythm And Depth] : normal respiratory rhythm and effort [Affect] : the affect was normal [Mood] : the mood was normal [Oriented To Time, Place, And Person] : oriented to person, place, and time [Not Anxious] : not anxious [Normal Station and Gait] : the gait and station were normal for the patient's age [No Focal Deficits] : no focal deficits [No Palpable Adenopathy] : no palpable adenopathy [Urethral Meatus] : the meatus of the urethra showed no abnormalities [External Female Genitalia] : normal external genitalia [Vagina] : normal vaginal exam [Cervix] : normal cervix

## 2020-04-30 NOTE — HISTORY OF PRESENT ILLNESS
[FreeTextEntry1] : s/p left ureterolithotripsy, ureteroplasty and insertion of stent 02/21/2020. C/o dysuria, hematuria and left flank discomfort.\par \par 03/26/2020: 5 weeks post stent insertion\par Stent removed today.\par Will follow up in one month\par \par 04/30/2020: increased frequency and urgency. PVR 2 cc., vaginal discharge.\par

## 2020-04-30 NOTE — ADDENDUM
[FreeTextEntry1] : I, Julia Innocent, acted solely as a scribe for Dr. Jose Camarena on this date, 04/30/2020.\par \par All medical record entries made by the Scribe were at my Dr. Jose Camarena direction and personally dictated by me on, 04/30/2020. I have reviewed the chart and agree that the record accurately reflects my personal performance of the history, physical exam, assessment and plan. I have also personally directed, reviewed and agreed with the chart.\par

## 2020-05-04 LAB
APPEARANCE: ABNORMAL
BACTERIA UR CULT: ABNORMAL
BACTERIA: NEGATIVE
BILIRUBIN URINE: NEGATIVE
BLOOD URINE: NEGATIVE
COLOR: NORMAL
GLUCOSE QUALITATIVE U: NEGATIVE
HYALINE CASTS: 1 /LPF
KETONES URINE: NEGATIVE
LEUKOCYTE ESTERASE URINE: ABNORMAL
MICROSCOPIC-UA: NORMAL
NITRITE URINE: NEGATIVE
PH URINE: 8
PROTEIN URINE: NEGATIVE
RED BLOOD CELLS URINE: 1 /HPF
SPECIFIC GRAVITY URINE: 1.01
SQUAMOUS EPITHELIAL CELLS: 15 /HPF
UROBILINOGEN URINE: NORMAL
WHITE BLOOD CELLS URINE: 10 /HPF

## 2020-05-05 ENCOUNTER — OUTPATIENT (OUTPATIENT)
Dept: OUTPATIENT SERVICES | Facility: HOSPITAL | Age: 55
LOS: 1 days | End: 2020-05-05

## 2020-05-05 ENCOUNTER — APPOINTMENT (OUTPATIENT)
Dept: ULTRASOUND IMAGING | Facility: CLINIC | Age: 55
End: 2020-05-05
Payer: COMMERCIAL

## 2020-05-05 PROCEDURE — 76770 US EXAM ABDO BACK WALL COMP: CPT | Mod: 26

## 2020-05-19 ENCOUNTER — APPOINTMENT (OUTPATIENT)
Dept: UROLOGY | Facility: CLINIC | Age: 55
End: 2020-05-19

## 2020-12-08 ENCOUNTER — APPOINTMENT (OUTPATIENT)
Dept: UROLOGY | Facility: CLINIC | Age: 55
End: 2020-12-08
Payer: COMMERCIAL

## 2020-12-08 VITALS — DIASTOLIC BLOOD PRESSURE: 74 MMHG | TEMPERATURE: 98.2 F | SYSTOLIC BLOOD PRESSURE: 107 MMHG | HEART RATE: 65 BPM

## 2020-12-08 DIAGNOSIS — R33.9 RETENTION OF URINE, UNSPECIFIED: ICD-10-CM

## 2020-12-08 PROCEDURE — 99213 OFFICE O/P EST LOW 20 MIN: CPT | Mod: 25

## 2020-12-08 PROCEDURE — 99072 ADDL SUPL MATRL&STAF TM PHE: CPT

## 2020-12-08 PROCEDURE — 51741 ELECTRO-UROFLOWMETRY FIRST: CPT

## 2020-12-08 RX ORDER — CEFUROXIME AXETIL 500 MG/1
500 TABLET ORAL
Qty: 14 | Refills: 0 | Status: DISCONTINUED | COMMUNITY
Start: 2020-05-04 | End: 2020-12-08

## 2020-12-08 NOTE — HISTORY OF PRESENT ILLNESS
[FreeTextEntry1] : s/p left ureterolithotripsy, ureteroplasty and insertion of stent 02/21/2020. C/o dysuria, hematuria and left flank discomfort.\par \par 03/26/2020: 5 weeks post stent insertion\par Stent removed today.\par Will follow up in one month\par \par 04/30/2020: increased frequency and urgency. PVR 2 cc., vaginal discharge.\par \par 12/08/2020: Pt is here today for follow up. UA on 04/30/2020: slightly turbid; Culture: Escherichia coli. Was treated was Ceftin 500 mg PO BID for 7 days on 05/04/2020.\par \par Renal sonogram on 05/05/2020: Renal cyst projecting exophytically from upper pole left kidney. No bilateral renal mass, calculi or hydronephrosis visualized.\par \par Pt c/o sensation of incomplete bladder emptying. Denies hematuria and dysuria.\par O/E: PVR 12 cc; No CVA tenderness\par \par Will get UA and culture\par Follow up in 3 months

## 2020-12-08 NOTE — ADDENDUM
[FreeTextEntry1] : I, Julia Innocent, acted solely as a scribe for Dr. Jose Camarena on this date, 12/08/2020.\par \par All medical record entries made by the Scribe were at my Dr. Jose Camarena direction and personally dictated by me on, 12/08/2020. I have reviewed the chart and agree that the record accurately reflects my personal performance of the history, physical exam, assessment and plan. I have also personally directed, reviewed and agreed with the chart.\par

## 2020-12-08 NOTE — PHYSICAL EXAM
[General Appearance - Well Developed] : well developed [General Appearance - Well Nourished] : well nourished [Normal Appearance] : normal appearance [Well Groomed] : well groomed [General Appearance - In No Acute Distress] : no acute distress [Abdomen Soft] : soft [Abdomen Tenderness] : non-tender [Costovertebral Angle Tenderness] : no ~M costovertebral angle tenderness [Urethral Meatus] : normal urethra [Urinary Bladder Findings] : the bladder was normal on palpation [External Female Genitalia] : normal external genitalia [Vagina] : normal vaginal exam [Cervix] : normal cervix [Edema] : no peripheral edema [] : no respiratory distress [Respiration, Rhythm And Depth] : normal respiratory rhythm and effort [Exaggerated Use Of Accessory Muscles For Inspiration] : no accessory muscle use [Oriented To Time, Place, And Person] : oriented to person, place, and time [Affect] : the affect was normal [Mood] : the mood was normal [Not Anxious] : not anxious [Normal Station and Gait] : the gait and station were normal for the patient's age [No Focal Deficits] : no focal deficits [No Palpable Adenopathy] : no palpable adenopathy

## 2020-12-23 PROBLEM — Z87.42 HISTORY OF VAGINITIS: Status: RESOLVED | Noted: 2020-03-09 | Resolved: 2020-12-23

## 2021-03-08 ENCOUNTER — APPOINTMENT (OUTPATIENT)
Dept: UROLOGY | Facility: CLINIC | Age: 56
End: 2021-03-08
Payer: COMMERCIAL

## 2021-03-08 VITALS — TEMPERATURE: 98.3 F | DIASTOLIC BLOOD PRESSURE: 68 MMHG | HEART RATE: 60 BPM | SYSTOLIC BLOOD PRESSURE: 105 MMHG

## 2021-03-08 DIAGNOSIS — N28.1 CYST OF KIDNEY, ACQUIRED: ICD-10-CM

## 2021-03-08 PROCEDURE — 99213 OFFICE O/P EST LOW 20 MIN: CPT

## 2021-03-08 PROCEDURE — 99072 ADDL SUPL MATRL&STAF TM PHE: CPT

## 2021-03-08 RX ORDER — BUSPIRONE HYDROCHLORIDE 5 MG/1
5 TABLET ORAL
Qty: 60 | Refills: 0 | Status: DISCONTINUED | COMMUNITY
Start: 2020-09-17 | End: 2021-03-08

## 2021-03-08 RX ORDER — SERTRALINE 25 MG/1
25 TABLET, FILM COATED ORAL
Qty: 30 | Refills: 0 | Status: DISCONTINUED | COMMUNITY
Start: 2020-09-17 | End: 2021-03-08

## 2021-03-08 RX ORDER — NITROFURANTOIN (MONOHYDRATE/MACROCRYSTALS) 25; 75 MG/1; MG/1
100 CAPSULE ORAL
Qty: 10 | Refills: 0 | Status: DISCONTINUED | COMMUNITY
Start: 2020-11-21 | End: 2021-03-08

## 2021-03-08 RX ORDER — MELOXICAM 15 MG/1
15 TABLET ORAL
Qty: 30 | Refills: 0 | Status: DISCONTINUED | COMMUNITY
Start: 2021-01-05 | End: 2021-03-08

## 2021-03-08 RX ORDER — ALBUTEROL SULFATE 90 UG/1
108 (90 BASE) INHALANT RESPIRATORY (INHALATION)
Qty: 18 | Refills: 0 | Status: DISCONTINUED | COMMUNITY
Start: 2020-06-08 | End: 2021-03-08

## 2021-03-08 RX ORDER — FLUCONAZOLE 150 MG/1
150 TABLET ORAL
Qty: 1 | Refills: 0 | Status: DISCONTINUED | COMMUNITY
Start: 2020-12-02 | End: 2021-03-08

## 2021-03-08 RX ORDER — LORATADINE 10 MG/1
10 TABLET ORAL
Qty: 30 | Refills: 0 | Status: DISCONTINUED | COMMUNITY
Start: 2020-06-08 | End: 2021-03-08

## 2021-03-08 NOTE — HISTORY OF PRESENT ILLNESS
[FreeTextEntry1] : s/p left ureterolithotripsy, ureteroplasty and insertion of stent 02/21/2020. C/o dysuria, hematuria and left flank discomfort.\par \par 03/26/2020: 5 weeks post stent insertion\par Stent removed today.\par Will follow up in one month\par \par 04/30/2020: increased frequency and urgency. PVR 2 cc., vaginal discharge.\par \par 12/08/2020: Pt is here today for follow up. UA on 04/30/2020: slightly turbid; Culture: Escherichia coli. Was treated was Ceftin 500 mg PO BID for 7 days on 05/04/2020.\par \par Renal sonogram on 05/05/2020: Renal cyst projecting exophytically from upper pole left kidney. No bilateral renal mass, calculi or hydronephrosis visualized.\par \par Pt c/o sensation of incomplete bladder emptying. Denies hematuria and dysuria.\par O/E: PVR 12 cc; No CVA tenderness\par \par Will get UA and culture\par Follow up in 3 months\par \par 03/08/2021: Pt is here today for follow up. Pt c/o foul smelling urine and intermittent left  flank pain. Denies hematuria and dysuria.No nausea and vomiting\par \par O/E: No CVA tenderness\par \par Will get UA and culture\par Follow up in 3 months\par renal sonogram at next visit to r/o hydronephrosis.\par \par

## 2021-03-08 NOTE — ADDENDUM
[FreeTextEntry1] : I, Julia Innocent, acted solely as a scribe for Dr. Jose Camarena on this date, 03/08/2021.\par \par All medical record entries made by the Scribe were at my Dr. Jose Camarena direction and personally dictated by me on, 03/08/2021. I have reviewed the chart and agree that the record accurately reflects my personal performance of the history, physical exam, assessment and plan. I have also personally directed, reviewed and agreed with the chart.\par

## 2021-03-10 LAB
APPEARANCE: CLEAR
BACTERIA UR CULT: NORMAL
BACTERIA: ABNORMAL
BILIRUBIN URINE: NEGATIVE
BLOOD URINE: NORMAL
COLOR: NORMAL
GLUCOSE QUALITATIVE U: NEGATIVE
HYALINE CASTS: 1 /LPF
KETONES URINE: NEGATIVE
LEUKOCYTE ESTERASE URINE: ABNORMAL
MICROSCOPIC-UA: NORMAL
NITRITE URINE: NEGATIVE
PH URINE: 7
PROTEIN URINE: NEGATIVE
RED BLOOD CELLS URINE: 5 /HPF
SPECIFIC GRAVITY URINE: 1.02
SQUAMOUS EPITHELIAL CELLS: 6 /HPF
UROBILINOGEN URINE: NORMAL
WHITE BLOOD CELLS URINE: 12 /HPF

## 2021-06-10 ENCOUNTER — APPOINTMENT (OUTPATIENT)
Dept: UROLOGY | Facility: CLINIC | Age: 56
End: 2021-06-10
Payer: COMMERCIAL

## 2021-06-10 ENCOUNTER — APPOINTMENT (OUTPATIENT)
Dept: UROLOGY | Facility: CLINIC | Age: 56
End: 2021-06-10

## 2021-06-10 VITALS
BODY MASS INDEX: 24.45 KG/M2 | HEART RATE: 56 BPM | OXYGEN SATURATION: 98 % | DIASTOLIC BLOOD PRESSURE: 57 MMHG | TEMPERATURE: 97.9 F | SYSTOLIC BLOOD PRESSURE: 93 MMHG | WEIGHT: 138 LBS

## 2021-06-10 DIAGNOSIS — N39.0 URINARY TRACT INFECTION, SITE NOT SPECIFIED: ICD-10-CM

## 2021-06-10 PROCEDURE — 99213 OFFICE O/P EST LOW 20 MIN: CPT

## 2021-06-10 NOTE — HISTORY OF PRESENT ILLNESS
[FreeTextEntry1] : s/p left ureterolithotripsy, ureteroplasty and insertion of stent 02/21/2020. C/o dysuria, hematuria and left flank discomfort.\par \par 03/26/2020: 5 weeks post stent insertion\par Stent removed today.\par Will follow up in one month\par \par 04/30/2020: increased frequency and urgency. PVR 2 cc., vaginal discharge.\par \par 12/08/2020: Pt is here today for follow up. UA on 04/30/2020: slightly turbid; Culture: Escherichia coli. Was treated was Ceftin 500 mg PO BID for 7 days on 05/04/2020.\par \par Renal sonogram on 05/05/2020: Renal cyst projecting exophytically from upper pole left kidney. No bilateral renal mass, calculi or hydronephrosis visualized.\par \par Pt c/o sensation of incomplete bladder emptying. Denies hematuria and dysuria.\par O/E: PVR 12 cc; No CVA tenderness\par \par Will get UA and culture\par Follow up in 3 months\par \par 03/08/2021: Pt is here today for follow up. Pt c/o foul smelling urine and intermittent left  flank pain. Denies hematuria and dysuria.No nausea and vomiting\par \par O/E: No CVA tenderness\par \par Will get UA and culture\par Follow up in 3 months\par renal sonogram at next visit to r/o hydronephrosis.\par \par 06/10/2021: Ms. JESUS is a 55 year female who presents today for a follow up for Acute UTI. She is doing well.  Pt still notes slight flank pain. Not kidney related. She denies hematuria and dysuria. \par \par Labs on 03/10/2021: UA: pyuria, microhematuria, bacteriuria; Culture: Probable collection contamination.\par Asymptomatic\par \par Will get UA and culture\par Follow up in 6 months for renal sonogram and check UA and culture\par

## 2021-06-10 NOTE — ADDENDUM
[FreeTextEntry1] : I, Julia Innocent , acted solely as a scribe for Dr. Jose Camarena on this date, 06/10/2021.\par \par All medical record entries made by the Scribe were at my Dr. Jose Camarena direction and personally dictated by me on, 06/10/2021. I have reviewed the chart and agree that the record accurately reflects my personal performance of the history, physical exam, assessment and plan. I have also personally directed, reviewed and agreed with the chart.\par \par

## 2021-06-14 LAB
APPEARANCE: ABNORMAL
BACTERIA UR CULT: ABNORMAL
BACTERIA: ABNORMAL
BILIRUBIN URINE: NEGATIVE
BLOOD URINE: ABNORMAL
COLOR: NORMAL
GLUCOSE QUALITATIVE U: NEGATIVE
HYALINE CASTS: 2 /LPF
KETONES URINE: NEGATIVE
LEUKOCYTE ESTERASE URINE: ABNORMAL
MICROSCOPIC-UA: NORMAL
NITRITE URINE: NEGATIVE
PH URINE: 6.5
PROTEIN URINE: NORMAL
RED BLOOD CELLS URINE: 6 /HPF
SPECIFIC GRAVITY URINE: 1.01
SQUAMOUS EPITHELIAL CELLS: 16 /HPF
UROBILINOGEN URINE: NORMAL
WHITE BLOOD CELLS URINE: 54 /HPF

## 2021-10-21 ENCOUNTER — APPOINTMENT (OUTPATIENT)
Dept: UROLOGY | Facility: CLINIC | Age: 56
End: 2021-10-21

## 2021-10-27 ENCOUNTER — APPOINTMENT (OUTPATIENT)
Dept: UROLOGY | Facility: CLINIC | Age: 56
End: 2021-10-27
Payer: COMMERCIAL

## 2021-10-27 VITALS — SYSTOLIC BLOOD PRESSURE: 127 MMHG | DIASTOLIC BLOOD PRESSURE: 82 MMHG | HEART RATE: 76 BPM | TEMPERATURE: 98.2 F

## 2021-10-27 DIAGNOSIS — Z96.0 PRESENCE OF UROGENITAL IMPLANTS: ICD-10-CM

## 2021-10-27 DIAGNOSIS — N20.1 CALCULUS OF URETER: ICD-10-CM

## 2021-10-27 DIAGNOSIS — N12 TUBULO-INTERSTITIAL NEPHRITIS, NOT SPECIFIED AS ACUTE OR CHRONIC: ICD-10-CM

## 2021-10-27 DIAGNOSIS — R39.15 URGENCY OF URINATION: ICD-10-CM

## 2021-10-27 DIAGNOSIS — R35.0 FREQUENCY OF MICTURITION: ICD-10-CM

## 2021-10-27 PROCEDURE — 99214 OFFICE O/P EST MOD 30 MIN: CPT

## 2021-10-27 RX ORDER — TAMSULOSIN HYDROCHLORIDE 0.4 MG/1
0.4 CAPSULE ORAL
Refills: 0 | Status: ACTIVE | COMMUNITY

## 2021-10-27 RX ORDER — CEFPODOXIME PROXETIL 200 MG/1
200 TABLET, FILM COATED ORAL
Refills: 0 | Status: ACTIVE | COMMUNITY

## 2021-10-27 RX ORDER — METRONIDAZOLE 7.5 MG/G
0.75 GEL VAGINAL
Qty: 1 | Refills: 0 | Status: DISCONTINUED | COMMUNITY
Start: 2020-03-09 | End: 2021-10-27

## 2021-10-27 NOTE — LETTER BODY
[Dear  ___] : Dear  [unfilled], [Consult Letter:] : I had the pleasure of evaluating your patient, [unfilled]. [Please see my note below.] : Please see my note below. [Consult Closing:] : Thank you very much for allowing me to participate in the care of this patient.  If you have any questions, please do not hesitate to contact me. [Sincerely,] : Sincerely, [FreeTextEntry3] : Jose Camarena MD\par

## 2021-10-27 NOTE — ADDENDUM
[FreeTextEntry1] : I, Sharan Workman, acted solely as a scribe for Dr. Jose Camarena on this date, 10/27/2021. \par \par All medical record entries made by Scribe were at my Dr. Jose Camarena direction and personally dictated by be on, 10/27/2021. I have reviewed the chart and agree that the record accurately reflects my personal performance of the history, physical exam, assessment and plan, I have also personally directed, reviewed and agreed with the chart.\par \par \par

## 2021-10-27 NOTE — HISTORY OF PRESENT ILLNESS
[FreeTextEntry1] : s/p left ureterolithotripsy, ureteroplasty and insertion of stent 02/21/2020. C/o dysuria, hematuria and left flank discomfort.\par \par 03/26/2020: 5 weeks post stent insertion\par Stent removed today.\par Will follow up in one month\par \par 04/30/2020: increased frequency and urgency. PVR 2 cc., vaginal discharge.\par \par 12/08/2020: Pt is here today for follow up. UA on 04/30/2020: slightly turbid; Culture: Escherichia coli. Was treated was Ceftin 500 mg PO BID for 7 days on 05/04/2020.\par \par Renal sonogram on 05/05/2020: Renal cyst projecting exophytically from upper pole left kidney. No bilateral renal mass, calculi or hydronephrosis visualized.\par \par Pt c/o sensation of incomplete bladder emptying. Denies hematuria and dysuria.\par O/E: PVR 12 cc; No CVA tenderness\par \par Will get UA and culture\par Follow up in 3 months\par \par 03/08/2021: Pt is here today for follow up. Pt c/o foul smelling urine and intermittent left  flank pain. Denies hematuria and dysuria.No nausea and vomiting\par \par O/E: No CVA tenderness\par \par Will get UA and culture\par Follow up in 3 months\par renal sonogram at next visit to r/o hydronephrosis.\par \par 06/10/2021: Ms. JESUS is a 55 year female who presents today for a follow up for Acute UTI. She is doing well.  Pt still notes slight flank pain. Not kidney related. She denies hematuria and dysuria. \par \par Labs on 03/10/2021: UA: pyuria, microhematuria, bacteriuria; Culture: Probable collection contamination.\par Asymptomatic\par \par Will get UA and culture\par Follow up in 6 months for renal sonogram and check UA and culture\par \par 10/27/2021: Ms. JESUS is a 55 year female who presents today for a follow for ureter stone. She had left flank pain secondary to 1 mm stone in left distal ureter on 10/20/2021. Treated with ureteral stent at [facility]. \par  CT  Scan: 10/20/2021: Impression: obstructing 1 mm calculus in the distal left ureter causing mild hydronephrosis. I have reviewed the scan on pts cell phone, difficult to see the stone. Will remove the stent.\par \par will remove stent next week.\par \par \par \par

## 2021-10-28 LAB
APPEARANCE: ABNORMAL
BACTERIA: ABNORMAL
BILIRUBIN URINE: NEGATIVE
BLOOD URINE: ABNORMAL
COLOR: COLORLESS
GLUCOSE QUALITATIVE U: NEGATIVE
HYALINE CASTS: 0 /LPF
KETONES URINE: NEGATIVE
LEUKOCYTE ESTERASE URINE: ABNORMAL
MICROSCOPIC-UA: NORMAL
NITRITE URINE: NEGATIVE
PH URINE: 6.5
PROTEIN URINE: NORMAL
RED BLOOD CELLS URINE: 69 /HPF
SPECIFIC GRAVITY URINE: 1.01
SQUAMOUS EPITHELIAL CELLS: 4 /HPF
UROBILINOGEN URINE: NORMAL
WHITE BLOOD CELLS URINE: 16 /HPF

## 2021-10-29 LAB — BACTERIA UR CULT: NORMAL

## 2021-11-11 ENCOUNTER — APPOINTMENT (OUTPATIENT)
Dept: UROLOGY | Facility: CLINIC | Age: 56
End: 2021-11-11

## 2021-12-07 ENCOUNTER — APPOINTMENT (OUTPATIENT)
Dept: UROLOGY | Facility: CLINIC | Age: 56
End: 2021-12-07

## 2022-08-10 ENCOUNTER — RX RENEWAL (OUTPATIENT)
Age: 57
End: 2022-08-10